# Patient Record
Sex: FEMALE | Race: WHITE | NOT HISPANIC OR LATINO | Employment: UNEMPLOYED | URBAN - NONMETROPOLITAN AREA
[De-identification: names, ages, dates, MRNs, and addresses within clinical notes are randomized per-mention and may not be internally consistent; named-entity substitution may affect disease eponyms.]

---

## 2017-01-05 RX ORDER — LEVOTHYROXINE SODIUM 0.03 MG/1
TABLET ORAL
Qty: 30 TABLET | Refills: 4 | Status: SHIPPED | OUTPATIENT
Start: 2017-01-05 | End: 2021-03-30 | Stop reason: SDUPTHER

## 2017-01-06 PROBLEM — Z91.89 HIGH RISK FOR DIABETES MELLITUS: Status: ACTIVE | Noted: 2017-01-06

## 2019-10-30 ENCOUNTER — OFFICE VISIT (OUTPATIENT)
Dept: SURGERY | Facility: CLINIC | Age: 59
End: 2019-10-30

## 2019-10-30 VITALS
BODY MASS INDEX: 33.16 KG/M2 | HEIGHT: 62 IN | TEMPERATURE: 98 F | OXYGEN SATURATION: 98 % | SYSTOLIC BLOOD PRESSURE: 130 MMHG | WEIGHT: 180.2 LBS | HEART RATE: 78 BPM | DIASTOLIC BLOOD PRESSURE: 87 MMHG

## 2019-10-30 DIAGNOSIS — E04.1 THYROID NODULE: Primary | ICD-10-CM

## 2019-10-30 PROCEDURE — 99243 OFF/OP CNSLTJ NEW/EST LOW 30: CPT | Performed by: SURGERY

## 2019-10-30 PROCEDURE — 10005 FNA BX W/US GDN 1ST LES: CPT | Performed by: SURGERY

## 2019-10-30 NOTE — PROGRESS NOTES
Patient: Monique Tierney    YOB: 1960    Date: 10/30/2019    Primary Care Provider: Linh Garber MD    Chief Complaint   Patient presents with   • Thyroid Problem       SUBJECTIVE:    History of present illness:  Patient is in the office today for an evaluation and consultation bilateral thyroid nodules. Patient is on Levothyroxine. She denies dysphagia, neck pain or heat intolerance.  She has a history of previous benign thyroid biopsies.  Patient is not on blood thinners.     She did have a previous thyroid ultrasound performed many years ago that showed a very small 7 mm solid region in the right thyroid lobe.    The following portions of the patient's history were reviewed and updated as appropriate: allergies, current medications, past family history, past medical history, past social history, past surgical history and problem list.       Review of Systems   Constitutional: Negative for chills, fever and unexpected weight change.   HENT: Negative for hearing loss, trouble swallowing and voice change.    Eyes: Negative for visual disturbance.   Respiratory: Negative for apnea, cough, chest tightness, shortness of breath and wheezing.    Cardiovascular: Negative for chest pain, palpitations and leg swelling.   Gastrointestinal: Negative for abdominal distention, abdominal pain, anal bleeding, blood in stool, constipation, diarrhea, nausea, rectal pain and vomiting.   Endocrine: Negative for cold intolerance and heat intolerance.   Genitourinary: Negative for difficulty urinating, dysuria and flank pain.   Musculoskeletal: Negative for back pain and gait problem.   Skin: Negative for color change, rash and wound.   Neurological: Negative for dizziness, syncope, speech difficulty, weakness, light-headedness, numbness and headaches.   Hematological: Negative for adenopathy. Does not bruise/bleed easily.   Psychiatric/Behavioral: Negative for confusion. The patient is not  "nervous/anxious.        Allergies:  No Known Allergies    Medications:    Current Outpatient Medications:   •  doxycycline (MONODOX) 100 MG capsule, 1 po bid, Disp: 20 capsule, Rfl: 0  •  ibuprofen (ADVIL,MOTRIN) 200 MG tablet, Take 200 mg by mouth Every 6 (Six) Hours As Needed for Mild Pain ., Disp: , Rfl:   •  levothyroxine (SYNTHROID, LEVOTHROID) 25 MCG tablet, TAKE 1 TABLET BY MOUTH ONE TIME A DAY , Disp: 30 tablet, Rfl: 4    History:  Past Medical History:   Diagnosis Date   • Arthritis    • Endometriosis    • Hyperthyroidism    • Uterine leiomyoma        Past Surgical History:   Procedure Laterality Date   • HYSTERECTOMY Bilateral 1999    Total Hysterectomy   • OOPHORECTOMY         Family History   Problem Relation Age of Onset   • Arthritis Mother    • Diabetes Mother    • Hypertension Mother    • Thyroid disease Mother    • Hyperlipidemia Mother    • Heart attack Father    • Heart disease Father    • Breast cancer Neg Hx    • Ovarian cancer Neg Hx        Social History     Tobacco Use   • Smoking status: Former Smoker   • Smokeless tobacco: Never Used   Substance Use Topics   • Alcohol use: No   • Drug use: No        OBJECTIVE:    Vital Signs:   Vitals:    10/30/19 1248   BP: 130/87   Pulse: 78   Temp: 98 °F (36.7 °C)   SpO2: 98%   Weight: 81.7 kg (180 lb 3.2 oz)   Height: 157.5 cm (62\")       Physical Exam:   General Appearance:    Alert, cooperative, in no acute distress   Head:    Normocephalic, without obvious abnormality, atraumatic   Eyes:            Lids and lashes normal, conjunctivae and sclerae normal, no   icterus, no pallor, corneas clear, PERRLA   Ears:    Ears appear intact with no abnormalities noted   Throat:   No oral lesions, no thrush, oral mucosa moist   Neck:   No adenopathy, supple, trachea midline, no thyromegaly, no   carotid bruit, no JVD, no masses   Lungs:     Clear to auscultation,respirations regular, even and                  unlabored    Heart:    Regular rhythm and normal " rate, normal S1 and S2, no            murmur, no gallop, no rub, no click   Chest Wall:    No abnormalities observed   Abdomen:     Normal bowel sounds, no masses, no organomegaly, soft        non-tender, non-distended, no guarding, no rebound                tenderness   Extremities:   Moves all extremities well, no edema, no cyanosis, no             redness   Pulses:   Pulses palpable and equal bilaterally   Skin:   No bleeding, bruising or rash   Lymph nodes:   No palpable adenopathy   Neurologic:   Cranial nerves 2 - 12 grossly intact, sensation intact, DTR       present and equal bilaterally     Results Review:   I reviewed the patient's new clinical results.  I reviewed the patient's new imaging results and agree with the interpretation.  I reviewed the patient's other test results and agree with the interpretation    ASSESSMENT/PLAN:    1. Thyroid nodule        I did have a detailed and extensive discussion with the patient in the office today.  She has a very small 7 mm solid area in the right thyroid lobe that has not changed over many years, fine-needle aspiration was performed today in the office.  I have asked her to keep her next scheduled appointment with Dr. Garcia.    Procedure:    I recommend a FNA of the right thyroid lesion. The procedure and risks were clearly explained including bleeding, infection and requirement for re-biopsy and the patient understood these and wishes to proceed.    The patient was brought to the procedure room. Consent and time out were performed. The area was prepped and draped in the usual fashion. 1% lidocaine with epinephrine was infused locally. A 20G needle was used to biopsy the lesion with ultrasound guidance.  Minimal blood loss had occurred and hemostasis had been well controlled with pressure.  The patient tolerated the procedure and there were no complications. We will make a f/u appointment to discuss results.      I discussed the patients findings and my  recommendations with patient    Review of Systems was reviewed and confirmed as accurate today.    Electronically signed by Erick Denny MD  10/31/19      .

## 2019-12-11 ENCOUNTER — OFFICE VISIT (OUTPATIENT)
Dept: SURGERY | Facility: CLINIC | Age: 59
End: 2019-12-11

## 2019-12-11 VITALS
OXYGEN SATURATION: 99 % | TEMPERATURE: 98.2 F | HEART RATE: 90 BPM | DIASTOLIC BLOOD PRESSURE: 70 MMHG | BODY MASS INDEX: 32.61 KG/M2 | SYSTOLIC BLOOD PRESSURE: 104 MMHG | HEIGHT: 62 IN | WEIGHT: 177.2 LBS

## 2019-12-11 DIAGNOSIS — K82.8 BILIARY DYSKINESIA: Primary | ICD-10-CM

## 2019-12-11 DIAGNOSIS — L72.3 SEBACEOUS CYST: ICD-10-CM

## 2019-12-11 PROCEDURE — 99204 OFFICE O/P NEW MOD 45 MIN: CPT | Performed by: SURGERY

## 2019-12-11 RX ORDER — SODIUM CHLORIDE 9 MG/ML
100 INJECTION, SOLUTION INTRAVENOUS CONTINUOUS
Status: CANCELLED | OUTPATIENT
Start: 2019-12-11

## 2019-12-11 NOTE — PROGRESS NOTES
Patient: oMnique Tierney    YOB: 1960    Date: 12/11/2019    Primary Care Provider: Linh Garber MD    Chief Complaint   Patient presents with   • Abdominal Pain       SUBJECTIVE:    History of present illness:  I saw the patient in the office today as a consultation for evaluation and treatment of intermittent stabbing right upper quadrant abdominal pain that does not radiate to her back or shoulders.  She has had the pain for 4-5 months.  She complains of nausea and diarrhea when she has attacks.  She denies vomiting or constipation.  Greasy or sweet foods cause the symptoms to worsen.  She had a hida scan that showed 0% ejection fraction.  Pain is about 5 out of 10, last 1-1/2 to 2 hours after eating.  Does not radiate except to the epigastric region and chest area.  No significant heartburn or reflux symptoms.    The following portions of the patient's history were reviewed and updated as appropriate: allergies, current medications, past family history, past medical history, past social history, past surgical history and problem list.    Review of Systems   Constitutional: Negative for chills, fever and unexpected weight change.   HENT: Negative for hearing loss, trouble swallowing and voice change.    Eyes: Negative for visual disturbance.   Respiratory: Negative for apnea, cough, chest tightness, shortness of breath and wheezing.    Cardiovascular: Negative for chest pain, palpitations and leg swelling.   Gastrointestinal: Positive for abdominal pain, diarrhea and nausea. Negative for abdominal distention, anal bleeding, blood in stool, constipation, rectal pain and vomiting.   Endocrine: Negative for cold intolerance and heat intolerance.   Genitourinary: Negative for difficulty urinating, dysuria and flank pain.   Musculoskeletal: Negative for back pain and gait problem.   Skin: Negative for color change, rash and wound.   Neurological: Negative for dizziness, syncope, speech  "difficulty, weakness, light-headedness, numbness and headaches.   Hematological: Negative for adenopathy. Does not bruise/bleed easily.   Psychiatric/Behavioral: Negative for confusion. The patient is not nervous/anxious.        History:  Past Medical History:   Diagnosis Date   • Arthritis    • Endometriosis    • Hyperthyroidism    • Uterine leiomyoma        Past Surgical History:   Procedure Laterality Date   • HYSTERECTOMY Bilateral 1999    Total Hysterectomy   • OOPHORECTOMY         Family History   Problem Relation Age of Onset   • Arthritis Mother    • Diabetes Mother    • Hypertension Mother    • Thyroid disease Mother    • Hyperlipidemia Mother    • Heart attack Father    • Heart disease Father    • Breast cancer Neg Hx    • Ovarian cancer Neg Hx        Social History     Tobacco Use   • Smoking status: Former Smoker   • Smokeless tobacco: Never Used   Substance Use Topics   • Alcohol use: No   • Drug use: No       Allergies:  No Known Allergies    Medications:    Current Outpatient Medications:   •  ibuprofen (ADVIL,MOTRIN) 200 MG tablet, Take 200 mg by mouth Every 6 (Six) Hours As Needed for Mild Pain ., Disp: , Rfl:   •  levothyroxine (SYNTHROID, LEVOTHROID) 25 MCG tablet, TAKE 1 TABLET BY MOUTH ONE TIME A DAY  (Patient taking differently: 35 mcg.), Disp: 30 tablet, Rfl: 4    OBJECTIVE:    Vital Signs:   Vitals:    12/11/19 1038   BP: 104/70   Pulse: 90   Temp: 98.2 °F (36.8 °C)   TempSrc: Temporal   SpO2: 99%   Weight: 80.4 kg (177 lb 3.2 oz)   Height: 157.5 cm (62\")       Physical Exam:   General Appearance:    Alert, cooperative, in no acute distress   Head:    Normocephalic, without obvious abnormality, atraumatic   Eyes:            Lids and lashes normal, conjunctivae and sclerae normal, no   icterus, no pallor, corneas clear, PERRLA   Ears:    Ears appear intact with no abnormalities noted   Throat:   No oral lesions, no thrush, oral mucosa moist   Neck:   No adenopathy, supple, trachea midline, no " thyromegaly, no   carotid bruit, no JVD   Lungs:     Clear to auscultation,respirations regular, even and                  unlabored    Heart:    Regular rhythm and normal rate, normal S1 and S2, no            murmur   Abdomen:     no masses, no organomegaly, soft and tender right upper quadrant with mild guarding but no rebound.  No abdominal wall hernias.   Extremities:   Moves all extremities well, no edema, no cyanosis, no             redness   Pulses:   Pulses palpable and equal bilaterally   Skin:   No bleeding, bruising or rash.  3 to 4 cm sebaceous cyst on the left upper back.  Mobile and fixed to the skin.   Lymph nodes:   No palpable adenopathy   Neurologic:   Cranial nerves 2 - 12 grossly intact, sensation intact      Results Review:   I reviewed the patient's new clinical results.    Review of Systems was reviewed and confirmed as accurate.    ASSESSMENT/PLAN:    1. Biliary dyskinesia    2. Sebaceous cyst        I had a detailed and extensive discussion with the patient in the office and they understand that they need to undergo laparoscopic cholecystectomy with intraoperative cholangiography or possible open cholecystectomy. Full risks and benefits of operative versus nonoperative intervention were discussed with the patient and these included things such as nonresolution of symptoms and possible worsening of symptoms without surgical intervention versus infection, bleeding, open cholecystectomy, common bile duct injury, postoperative biliary leakage, need for drain placement, possible inability to perform cholangiography due to inflammation, postoperative abscess, etc with surgical intervention. The patient understands, agrees, and wishes to proceed with the surgical treatment plan as mentioned above. The patient had no questions for me at the end of the discussion.  I did draw a picture of the anatomy for the patient and used this in my informed consent.  Patient placed on a low-fat diet in the  interim.  Patient also will undergo excision sebaceous cyst on the upper back.     I discussed the patients findings and my recommendations with patient.    Electronically signed by Ezra Iniguez MD  12/11/19    Portions of this note have been scribed for Ezra Iniguez MD by Michelle Milligan. 12/11/2019  11:02 AM

## 2019-12-12 PROBLEM — K82.8 BILIARY DYSKINESIA: Status: ACTIVE | Noted: 2019-12-12

## 2019-12-12 PROBLEM — L72.3 SEBACEOUS CYST: Status: ACTIVE | Noted: 2019-12-12

## 2019-12-17 ENCOUNTER — TELEPHONE (OUTPATIENT)
Dept: SURGERY | Facility: CLINIC | Age: 59
End: 2019-12-17

## 2019-12-17 NOTE — TELEPHONE ENCOUNTER
Patient wants to cancel her surgery that is scheduled 12/27/19 .Patient stated she will call back at a later date and reschedule.

## 2020-02-06 NOTE — PROGRESS NOTES
Patient: Monique Tierney    YOB: 1960    Date: 02/12/2020    Primary Care Provider: Linh Garber MD    Reason for Consultation: Lesion    Chief Complaint   Patient presents with   • Mass     on the back       Subjective .     History of present illness:  I saw the patient in the office  today as a consultation for evaluation and treatment of cyst @ back right upper.    The following portions of the patient's history were reviewed and updated as appropriate: allergies, current medications, past family history, past medical history, past social history, past surgical history and problem list.    Review of Systems   Constitutional: Negative for chills, fever and unexpected weight change.   HENT: Negative for hearing loss, trouble swallowing and voice change.    Eyes: Negative for visual disturbance.   Respiratory: Negative for apnea, cough, chest tightness, shortness of breath and wheezing.    Cardiovascular: Negative for chest pain, palpitations and leg swelling.   Gastrointestinal: Negative for abdominal distention, abdominal pain, anal bleeding, blood in stool, constipation, diarrhea, nausea, rectal pain and vomiting.   Endocrine: Negative for cold intolerance and heat intolerance.   Genitourinary: Negative for difficulty urinating, dysuria and flank pain.   Musculoskeletal: Negative for back pain and gait problem.   Skin: Negative for color change, rash and wound.   Neurological: Negative for dizziness, syncope, speech difficulty, weakness, light-headedness, numbness and headaches.   Hematological: Negative for adenopathy. Does not bruise/bleed easily.   Psychiatric/Behavioral: Negative for confusion. The patient is not nervous/anxious.        History:  Past Medical History:   Diagnosis Date   • Arthritis    • Endometriosis    • Hyperthyroidism    • Uterine leiomyoma        Past Surgical History:   Procedure Laterality Date   • HYSTERECTOMY Bilateral 1999    Total Hysterectomy   •  "OOPHORECTOMY         Family History   Problem Relation Age of Onset   • Arthritis Mother    • Diabetes Mother    • Hypertension Mother    • Thyroid disease Mother    • Hyperlipidemia Mother    • Heart attack Father    • Heart disease Father    • Breast cancer Neg Hx    • Ovarian cancer Neg Hx        Social History     Tobacco Use   • Smoking status: Former Smoker   • Smokeless tobacco: Never Used   Substance Use Topics   • Alcohol use: No   • Drug use: No        Allergies:  No Known Allergies    Medications:    Current Outpatient Medications:   •  ibuprofen (ADVIL,MOTRIN) 200 MG tablet, Take 200 mg by mouth Every 6 (Six) Hours As Needed for Mild Pain ., Disp: , Rfl:   •  levothyroxine (SYNTHROID, LEVOTHROID) 25 MCG tablet, TAKE 1 TABLET BY MOUTH ONE TIME A DAY  (Patient taking differently: 35 mcg.), Disp: 30 tablet, Rfl: 4    Objective     Vital Signs:   Vitals:    02/12/20 1107   BP: 132/82   Pulse: 83   Temp: 97.7 °F (36.5 °C)   TempSrc: Temporal   SpO2: 95%   Weight: 78.7 kg (173 lb 9.6 oz)   Height: 157.5 cm (62\")       Physical Exam:  Lungs:     Clear to auscultation,respirations regular, even and                  unlabored    Heart:    Regular rhythm and normal rate, normal S1 and S2, no            murmur      General Appearance:    Alert, cooperative, in no acute distress   Head:    Normocephalic, without obvious abnormality, atraumatic   Eyes:            Lids and lashes normal, conjunctivae and sclerae normal, no   icterus, no pallor, corneas clear.   Ears:    Ears appear intact with no abnormalities noted   Throat:   No oral lesions, no thrush, oral mucosa moist   Neck:   No adenopathy, supple, trachea midline, no thyromegaly, no   carotid bruit.   Extremities:   Moves all extremities well, no edema, no cyanosis, no             Redness.    Pulses:   Pulses palpable and equal bilaterally   Skin:   No bleeding, bruising or rash. Lesion mid back   Lymph nodes:   No palpable adenopathy   Neurologic:   Cranial " nerves 2 - 12 grossly intact, sensation intact.     Results Review:   I reviewed the patient's new clinical results.    Review of Systems was reviewed and confirmed as accurate as documented by the MA.    Assessment/Plan     1. Sebaceous cyst        I did have a detailed and extensive discussion with the patient in the hospital and they understand that they need to undergo excision of sebaceous cyst on the back. Full risks and benefits of operative versus nonoperative intervention were discussed with the patient and these include bleeding, infection and reccurrence. The patient understands, agrees, and wishes to proceed with the surgical treatment plan as mentioned above. The patient had no questions for me at the end of the discussion.       I discussed the patients findings and my recommendations with patient and consulting provider.    Location: Back    Procedure: Excision 4 cm sebaceous cyst back with layered closure      I recommend excision. Procedure and the risks and benefits were explained including bleeding and infection. The patient understands these and wishes to proceed.     The patient was brought to the procedure room. Consent and time out were performed. The area was prepped and draped in the usual fashion. 1% lidocaine with epinephrine was infused locally. An ellyptical incision was made around the lesion. Full thickness excision was performed. The lesion size was 4 cm. The wound was closed in layers with interrupted simple vicryl and Nylon for the skin. Wound closure size was 5 cm. There were no complications and the patient tolerated the procedure well. Hemostasis was well controlled with pressure and there was minimal blood loss. Wound instructions were given.     Electronically signed by Ezra Iniguez MD  02/12/20  11:55 AM    Portions of this note have been scribed for Ezra Iniguez MD by Jade Kingston. 2/12/2020  11:55 AM

## 2020-02-12 ENCOUNTER — PROCEDURE VISIT (OUTPATIENT)
Dept: SURGERY | Facility: CLINIC | Age: 60
End: 2020-02-12

## 2020-02-12 VITALS
WEIGHT: 173.6 LBS | HEART RATE: 83 BPM | TEMPERATURE: 97.7 F | OXYGEN SATURATION: 95 % | HEIGHT: 62 IN | DIASTOLIC BLOOD PRESSURE: 82 MMHG | SYSTOLIC BLOOD PRESSURE: 132 MMHG | BODY MASS INDEX: 31.94 KG/M2

## 2020-02-12 DIAGNOSIS — L72.3 SEBACEOUS CYST: Primary | ICD-10-CM

## 2020-02-12 PROCEDURE — 12032 INTMD RPR S/A/T/EXT 2.6-7.5: CPT | Performed by: SURGERY

## 2020-02-12 PROCEDURE — 11406 EXC TR-EXT B9+MARG >4.0 CM: CPT | Performed by: SURGERY

## 2020-02-14 NOTE — PROGRESS NOTES
"Patient: Monique Tierney    YOB: 1960    Date: 02/19/2020    Primary Care Provider: Linh Garber MD    Chief Complaint   Patient presents with   • Post-op Follow-up     excision @ back       History of present illness:  I saw the patient in the office today as a followup from their recent sebaceous cyst excision @ back.  They state that they have done well and are having no problems.    Vital Signs:  Vitals:    02/19/20 1100   BP: 110/62   Pulse: 79   Temp: 97.5 °F (36.4 °C)   TempSrc: Temporal   SpO2: 96%   Weight: 78.5 kg (173 lb)   Height: 157.5 cm (62\")       Physical Exam:   General Appearance:    Alert, cooperative, in no acute distress, wound clean dry without infection   Abdomen:     no masses, no organomegaly, soft non-tender, non-distended, no guarding, wounds are well healed   Chest:      Clear to ausculation       Assessment / Plan:    1. Postoperative visit        I did discuss the situation with the patient today in the office and they have done well from their recent lesion excision, I don't think that the patient needs any further intervention and I need to see them back only if they have further problems. Pathology report was reviewed with the patient in the office.    Electronically signed by Ezra Iniguez MD  02/19/20                Portions of this note have been scribed for Ezra Iniguez MD by Jade Kingston. 2/19/2020  11:10 AM        "

## 2020-02-19 ENCOUNTER — OFFICE VISIT (OUTPATIENT)
Dept: SURGERY | Facility: CLINIC | Age: 60
End: 2020-02-19

## 2020-02-19 VITALS
TEMPERATURE: 97.5 F | WEIGHT: 173 LBS | HEIGHT: 62 IN | DIASTOLIC BLOOD PRESSURE: 62 MMHG | OXYGEN SATURATION: 96 % | HEART RATE: 79 BPM | SYSTOLIC BLOOD PRESSURE: 110 MMHG | BODY MASS INDEX: 31.83 KG/M2

## 2020-02-19 DIAGNOSIS — Z48.89 POSTOPERATIVE VISIT: Primary | ICD-10-CM

## 2020-02-19 PROCEDURE — 99024 POSTOP FOLLOW-UP VISIT: CPT | Performed by: SURGERY

## 2021-02-17 ENCOUNTER — OFFICE VISIT (OUTPATIENT)
Dept: INTERNAL MEDICINE | Facility: CLINIC | Age: 61
End: 2021-02-17

## 2021-02-17 VITALS
HEIGHT: 62 IN | HEART RATE: 100 BPM | WEIGHT: 193 LBS | TEMPERATURE: 96.8 F | OXYGEN SATURATION: 96 % | BODY MASS INDEX: 35.51 KG/M2 | SYSTOLIC BLOOD PRESSURE: 120 MMHG | DIASTOLIC BLOOD PRESSURE: 82 MMHG

## 2021-02-17 DIAGNOSIS — R73.03 PREDIABETES: ICD-10-CM

## 2021-02-17 DIAGNOSIS — M17.0 OSTEOARTHRITIS OF BOTH KNEES, UNSPECIFIED OSTEOARTHRITIS TYPE: ICD-10-CM

## 2021-02-17 DIAGNOSIS — R53.83 OTHER FATIGUE: ICD-10-CM

## 2021-02-17 DIAGNOSIS — Z90.79 STATUS POST TAH-BSO: Primary | ICD-10-CM

## 2021-02-17 DIAGNOSIS — Z90.710 STATUS POST TAH-BSO: Primary | ICD-10-CM

## 2021-02-17 DIAGNOSIS — Z90.722 STATUS POST TAH-BSO: Primary | ICD-10-CM

## 2021-02-17 DIAGNOSIS — I45.9 SKIPPED BEATS: ICD-10-CM

## 2021-02-17 DIAGNOSIS — E66.9 CLASS 1 OBESITY WITHOUT SERIOUS COMORBIDITY WITH BODY MASS INDEX (BMI) OF 34.0 TO 34.9 IN ADULT, UNSPECIFIED OBESITY TYPE: ICD-10-CM

## 2021-02-17 DIAGNOSIS — E03.9 HYPOTHYROIDISM, UNSPECIFIED TYPE: ICD-10-CM

## 2021-02-17 DIAGNOSIS — M79.645 CHRONIC PAIN OF LEFT THUMB: ICD-10-CM

## 2021-02-17 DIAGNOSIS — G89.29 CHRONIC PAIN OF LEFT THUMB: ICD-10-CM

## 2021-02-17 DIAGNOSIS — Z87.891 FORMER TOBACCO USE: ICD-10-CM

## 2021-02-17 PROCEDURE — 99214 OFFICE O/P EST MOD 30 MIN: CPT | Performed by: INTERNAL MEDICINE

## 2021-02-17 PROCEDURE — 93005 ELECTROCARDIOGRAM TRACING: CPT | Performed by: INTERNAL MEDICINE

## 2021-02-17 NOTE — PROGRESS NOTES
"Subjective   Moniquemigel Tierney is a 60 y.o. female.     Chief Complaint   Patient presents with   • Establish Care     family history of diabetes- request A1C   • Knee Pain     bilateral; right knee is worse   • Skin Problem     right upper thigh; muscular\"knots\"; sore       History of Present Illness   Patient presents to establish care. Osteoarthritis present in knees bilateral, pain is worse in the right knee. Follows with orthopedics, receives knee injections, bone to bone. Knots in leg painful to the touch, ibuprofen helps some. Glucose elevated on last lab but states she had eaten that day. Ovarian cyst total hysterectomy. Heart occasionally skips a beat, 6 months. Once every 2 weeks. Feels like a flutter    Current Outpatient Medications:   •  ibuprofen (ADVIL,MOTRIN) 200 MG tablet, Take 200 mg by mouth Every 6 (Six) Hours As Needed for Mild Pain ., Disp: , Rfl:   •  levothyroxine (SYNTHROID, LEVOTHROID) 25 MCG tablet, TAKE 1 TABLET BY MOUTH ONE TIME A DAY  (Patient taking differently: 35 mcg.), Disp: 30 tablet, Rfl: 4    The following portions of the patient's history were reviewed and updated as appropriate: allergies, current medications, past family history, past medical history, past social history, past surgical history and problem list.    Review of Systems   Constitutional: Negative.    Respiratory: Negative.    Cardiovascular: Negative.    Gastrointestinal: Negative.    Musculoskeletal: Positive for arthralgias.   Neurological: Negative for dizziness and headaches.       Objective   Physical Exam  Constitutional:       Appearance: Normal appearance.   HENT:      Head: Normocephalic and atraumatic.   Neck:      Musculoskeletal: Neck supple.   Cardiovascular:      Rate and Rhythm: Normal rate and regular rhythm.   Pulmonary:      Effort: Pulmonary effort is normal.      Breath sounds: Normal breath sounds.   Abdominal:      Palpations: Abdomen is soft.   Skin:     General: Skin is warm.   Neurological: "      Mental Status: She is alert and oriented to person, place, and time.   Psychiatric:         Mood and Affect: Mood normal.         All tests have been reviewed.    Assessment/Plan   Diagnoses and all orders for this visit:    Status post ARTHUR-BSO    Other fatigue  -     CBC & Differential  -     Comprehensive Metabolic Panel    Prediabetes  -     Lipid Panel  -     Hemoglobin A1c    Hypothyroidism, unspecified type  -     TSH  -     T4, Free    Osteoarthritis of both knees, unspecified osteoarthritis type  -     C-reactive Protein  -     Sedimentation Rate    Class 1 obesity without serious comorbidity with body mass index (BMI) of 34.0 to 34.9 in adult, unspecified obesity type    Former tobacco use    Chronic pain of left thumb    Skipped beats    Other orders  -     ECG 12 Lead    1 mo after labgs    ECG 12 Lead    Date/Time: 2/17/2021 1:59 PM  Performed by: Sebastian Ceballos MD  Authorized by: Sebastian Ceballos MD   Comparison: not compared with previous ECG   Rhythm: sinus rhythm  Rate: normal  Conduction: conduction normal  ST Segments: ST segments normal  QRS axis: normal    Clinical impression: normal ECG

## 2021-03-17 ENCOUNTER — OFFICE VISIT (OUTPATIENT)
Dept: INTERNAL MEDICINE | Facility: CLINIC | Age: 61
End: 2021-03-17

## 2021-03-17 VITALS
SYSTOLIC BLOOD PRESSURE: 130 MMHG | BODY MASS INDEX: 35.7 KG/M2 | HEART RATE: 100 BPM | WEIGHT: 194 LBS | TEMPERATURE: 95 F | OXYGEN SATURATION: 98 % | HEIGHT: 62 IN | DIASTOLIC BLOOD PRESSURE: 80 MMHG

## 2021-03-17 DIAGNOSIS — Z91.89 HIGH RISK FOR DIABETES MELLITUS: Primary | ICD-10-CM

## 2021-03-17 DIAGNOSIS — E66.9 CLASS 1 OBESITY WITHOUT SERIOUS COMORBIDITY WITH BODY MASS INDEX (BMI) OF 34.0 TO 34.9 IN ADULT, UNSPECIFIED OBESITY TYPE: ICD-10-CM

## 2021-03-17 DIAGNOSIS — M17.0 OSTEOARTHRITIS OF BOTH KNEES, UNSPECIFIED OSTEOARTHRITIS TYPE: ICD-10-CM

## 2021-03-17 DIAGNOSIS — I45.9 SKIPPED BEATS: ICD-10-CM

## 2021-03-17 DIAGNOSIS — R73.03 PREDIABETES: ICD-10-CM

## 2021-03-17 DIAGNOSIS — Z87.891 FORMER TOBACCO USE: ICD-10-CM

## 2021-03-17 DIAGNOSIS — K82.8 BILIARY DYSKINESIA: ICD-10-CM

## 2021-03-17 DIAGNOSIS — Z12.11 COLON CANCER SCREENING: ICD-10-CM

## 2021-03-17 DIAGNOSIS — E04.9 GOITER: ICD-10-CM

## 2021-03-17 DIAGNOSIS — M79.645 CHRONIC PAIN OF LEFT THUMB: ICD-10-CM

## 2021-03-17 DIAGNOSIS — L72.3 SEBACEOUS CYST: ICD-10-CM

## 2021-03-17 DIAGNOSIS — E03.9 HYPOTHYROIDISM, UNSPECIFIED TYPE: ICD-10-CM

## 2021-03-17 DIAGNOSIS — Z90.79 STATUS POST TAH-BSO: ICD-10-CM

## 2021-03-17 DIAGNOSIS — Z90.722 STATUS POST TAH-BSO: ICD-10-CM

## 2021-03-17 DIAGNOSIS — G89.29 CHRONIC PAIN OF LEFT THUMB: ICD-10-CM

## 2021-03-17 DIAGNOSIS — Z90.710 STATUS POST TAH-BSO: ICD-10-CM

## 2021-03-17 DIAGNOSIS — R53.83 OTHER FATIGUE: ICD-10-CM

## 2021-03-17 PROCEDURE — 99396 PREV VISIT EST AGE 40-64: CPT | Performed by: INTERNAL MEDICINE

## 2021-03-17 RX ORDER — CELECOXIB 200 MG/1
200 CAPSULE ORAL DAILY
Qty: 30 CAPSULE | Refills: 1 | Status: SHIPPED | OUTPATIENT
Start: 2021-03-17 | End: 2021-04-29

## 2021-03-17 NOTE — PROGRESS NOTES
Subjective   Monique Tierney is a 60 y.o. female and is here for a comprehensive physical exam.     Patient here for physical also has medical problems to be discussed.  Fatigue no energy.  No sleeping problem.  Patient does have prediabetes and hypothyroidism.  Patient is taking thyroid medication.  Also complains of both knee joints pain right side worse bearing weight worse medication over-the-counter no help.  Weight is still elevated BMI 35.  Also complains numbness tingling in right hand waking up in the morning.  Occasional skipped beats.  Heart rate today 100.  Patient complains anxious nervous denies any depression suicidal thoughts.    Do you take any herbs or supplements that were not prescribed by a doctor? no  Are you taking calcium supplements? no  Are you taking aspirin daily? no      The following portions of the patient's history were reviewed and updated as appropriate: allergies, current medications, past family history, past medical history, past social history, past surgical history and problem list.      Review of Systems   Constitutional: Positive for fatigue.   HENT: Negative.    Eyes: Negative.    Respiratory: Negative.    Cardiovascular: Negative.    Gastrointestinal: Negative.    Endocrine: Negative.    Genitourinary: Negative.    Musculoskeletal: Positive for arthralgias.   Skin: Negative.    Allergic/Immunologic: Negative.    Neurological: Negative.    Hematological: Negative.    Psychiatric/Behavioral: The patient is nervous/anxious.          Physical Exam  Constitutional:       Appearance: Normal appearance. She is obese.   HENT:      Head: Normocephalic and atraumatic.      Right Ear: Tympanic membrane, ear canal and external ear normal.      Left Ear: Tympanic membrane, ear canal and external ear normal.      Mouth/Throat:      Mouth: Mucous membranes are moist.      Pharynx: Oropharynx is clear.   Eyes:      Extraocular Movements: Extraocular movements intact.       Conjunctiva/sclera: Conjunctivae normal.      Pupils: Pupils are equal, round, and reactive to light.   Cardiovascular:      Rate and Rhythm: Normal rate and regular rhythm.      Heart sounds: Normal heart sounds.   Pulmonary:      Effort: Pulmonary effort is normal.      Breath sounds: Normal breath sounds.   Abdominal:      General: Bowel sounds are normal.   Musculoskeletal:         General: Tenderness present. Normal range of motion.      Cervical back: Neck supple.   Skin:     General: Skin is warm.   Neurological:      Mental Status: She is alert and oriented to person, place, and time. Mental status is at baseline.   Psychiatric:         Mood and Affect: Mood normal.         Behavior: Behavior normal.         Thought Content: Thought content normal.         Judgment: Judgment normal.         All  tests have been reviewed.    Assessment/Plan          1. Patient Counseling:  --Nutrition: Stressed importance of moderation in sodium/caffeine intake, saturated fat and cholesterol, caloric balance, sufficient intake of fresh fruits, vegetables, fiber, calcium and iron.  --Exercise: Stressed the importance of regular exercise.   --Injury prevention: Discussed safety belts, safety helmets, smoke detector, smoking near bedding or upholstery.   --Dental health: Discussed importance of regular tooth brushing, flossing, and dental visits.  --Immunizations reviewed.  --Discussed benefits of screening colonoscopy.  --After hours service discussed with patient    2. Discussed the patient's BMI with her.            Status post ARTHUR-BSO DUE to fibroids     Other fatigue check lab  -     CBC & Differential  -     Comprehensive Metabolic Panel     Prediabetes check lab  -     Lipid Panel  -     Hemoglobin A1c     Hypothyroidism, continue medication  -     TSH  -     T4, Free     Osteoarthritis of both knees, unspecified osteoarthritis type start celebrex and follow-up with orthopedist possible knee joint replacement.  Also check  lab rule out rheumatoid arthritis  -     C-reactive Protein  -     Sedimentation Rate     Class 1 obesity without serious comorbidity with body mass index (BMI) of 34.0 to 34.9 in adult, unspecified obesity type need a good diet weight loss     Former tobacco use     Hand numbness asked patient to use wrist splint     Skipped beats occa     Repeat HR 80    Anxiety self controlled patient declined medication for now    Refuse colonoscopy agree cologuard     Vaccines after covid vaccine

## 2021-03-20 LAB
ALBUMIN SERPL-MCNC: 4.4 G/DL (ref 3.5–5.2)
ALBUMIN/GLOB SERPL: 1.9 G/DL
ALP SERPL-CCNC: 96 U/L (ref 39–117)
ALT SERPL-CCNC: 19 U/L (ref 1–33)
AST SERPL-CCNC: 19 U/L (ref 1–32)
BASOPHILS # BLD AUTO: 0.08 10*3/MM3 (ref 0–0.2)
BASOPHILS NFR BLD AUTO: 1.3 % (ref 0–1.5)
BILIRUB SERPL-MCNC: 0.4 MG/DL (ref 0–1.2)
BUN SERPL-MCNC: 16 MG/DL (ref 6–20)
BUN/CREAT SERPL: 26.7 (ref 7–25)
CALCIUM SERPL-MCNC: 9.5 MG/DL (ref 8.6–10.5)
CHLORIDE SERPL-SCNC: 104 MMOL/L (ref 98–107)
CHOLEST SERPL-MCNC: 194 MG/DL (ref 0–200)
CO2 SERPL-SCNC: 25.1 MMOL/L (ref 22–29)
CREAT SERPL-MCNC: 0.6 MG/DL (ref 0.57–1)
CRP SERPL-MCNC: <0.3 MG/DL (ref 0–0.5)
EOSINOPHIL # BLD AUTO: 0.11 10*3/MM3 (ref 0–0.4)
EOSINOPHIL NFR BLD AUTO: 1.8 % (ref 0.3–6.2)
ERYTHROCYTE [DISTWIDTH] IN BLOOD BY AUTOMATED COUNT: 12.2 % (ref 12.3–15.4)
ERYTHROCYTE [SEDIMENTATION RATE] IN BLOOD BY WESTERGREN METHOD: 16 MM/HR (ref 0–30)
GLOBULIN SER CALC-MCNC: 2.3 GM/DL
GLUCOSE SERPL-MCNC: 104 MG/DL (ref 65–99)
HBA1C MFR BLD: 5.4 % (ref 4.8–5.6)
HCT VFR BLD AUTO: 42.3 % (ref 34–46.6)
HDLC SERPL-MCNC: 45 MG/DL (ref 40–60)
HGB BLD-MCNC: 14.5 G/DL (ref 12–15.9)
IMM GRANULOCYTES # BLD AUTO: 0.02 10*3/MM3 (ref 0–0.05)
IMM GRANULOCYTES NFR BLD AUTO: 0.3 % (ref 0–0.5)
LDLC SERPL CALC-MCNC: 131 MG/DL (ref 0–100)
LYMPHOCYTES # BLD AUTO: 2.2 10*3/MM3 (ref 0.7–3.1)
LYMPHOCYTES NFR BLD AUTO: 35.1 % (ref 19.6–45.3)
MCH RBC QN AUTO: 29.9 PG (ref 26.6–33)
MCHC RBC AUTO-ENTMCNC: 34.3 G/DL (ref 31.5–35.7)
MCV RBC AUTO: 87.2 FL (ref 79–97)
MONOCYTES # BLD AUTO: 0.49 10*3/MM3 (ref 0.1–0.9)
MONOCYTES NFR BLD AUTO: 7.8 % (ref 5–12)
NEUTROPHILS # BLD AUTO: 3.37 10*3/MM3 (ref 1.7–7)
NEUTROPHILS NFR BLD AUTO: 53.7 % (ref 42.7–76)
NRBC BLD AUTO-RTO: 0 /100 WBC (ref 0–0.2)
PLATELET # BLD AUTO: 257 10*3/MM3 (ref 140–450)
POTASSIUM SERPL-SCNC: 4.5 MMOL/L (ref 3.5–5.2)
PROT SERPL-MCNC: 6.7 G/DL (ref 6–8.5)
RBC # BLD AUTO: 4.85 10*6/MM3 (ref 3.77–5.28)
SODIUM SERPL-SCNC: 140 MMOL/L (ref 136–145)
T4 FREE SERPL-MCNC: 1.13 NG/DL (ref 0.93–1.7)
TRIGL SERPL-MCNC: 98 MG/DL (ref 0–150)
TSH SERPL DL<=0.005 MIU/L-ACNC: 5.22 UIU/ML (ref 0.27–4.2)
VLDLC SERPL CALC-MCNC: 18 MG/DL (ref 5–40)
WBC # BLD AUTO: 6.27 10*3/MM3 (ref 3.4–10.8)

## 2021-03-30 ENCOUNTER — OFFICE VISIT (OUTPATIENT)
Dept: INTERNAL MEDICINE | Facility: CLINIC | Age: 61
End: 2021-03-30

## 2021-03-30 VITALS
DIASTOLIC BLOOD PRESSURE: 80 MMHG | TEMPERATURE: 96.6 F | SYSTOLIC BLOOD PRESSURE: 130 MMHG | HEART RATE: 91 BPM | HEIGHT: 62 IN | OXYGEN SATURATION: 98 % | BODY MASS INDEX: 35.51 KG/M2 | WEIGHT: 193 LBS

## 2021-03-30 DIAGNOSIS — R73.03 PREDIABETES: Primary | ICD-10-CM

## 2021-03-30 DIAGNOSIS — E03.9 HYPOTHYROIDISM, UNSPECIFIED TYPE: ICD-10-CM

## 2021-03-30 DIAGNOSIS — R53.83 OTHER FATIGUE: ICD-10-CM

## 2021-03-30 DIAGNOSIS — M17.0 OSTEOARTHRITIS OF BOTH KNEES, UNSPECIFIED OSTEOARTHRITIS TYPE: ICD-10-CM

## 2021-03-30 PROCEDURE — 99214 OFFICE O/P EST MOD 30 MIN: CPT | Performed by: INTERNAL MEDICINE

## 2021-03-30 RX ORDER — LEVOTHYROXINE SODIUM 0.05 MG/1
25 TABLET ORAL DAILY
Qty: 30 TABLET | Refills: 1 | Status: SHIPPED | OUTPATIENT
Start: 2021-03-30 | End: 2021-04-07 | Stop reason: SDUPTHER

## 2021-03-30 NOTE — PROGRESS NOTES
Subjective   Monique Tierney is a 60 y.o. female.     Chief Complaint   Patient presents with   • Follow-up     recent lab work        History of Present Illness   Patient here for follow-up.  Sugar slightly elevated TSH elevated LDL elevated occasional heart racing.  Knee joint pain pending surgery.  No rheumatoid arthritis.  Weight is still elevated.    Current Outpatient Medications:   •  levothyroxine (SYNTHROID, LEVOTHROID) 50 MCG tablet, Take 0.5 tablets by mouth Daily. 200001, Disp: 30 tablet, Rfl: 1  •  celecoxib (CeleBREX) 200 MG capsule, Take 1 capsule by mouth Daily., Disp: 30 capsule, Rfl: 1    The following portions of the patient's history were reviewed and updated as appropriate: allergies, current medications, past family history, past medical history, past social history, past surgical history and problem list.    Review of Systems   Constitutional: Negative.    HENT: Negative.    Eyes: Negative.    Respiratory: Negative.    Cardiovascular: Negative.    Gastrointestinal: Negative.    Endocrine: Negative.    Genitourinary: Negative.    Musculoskeletal: Negative.    Skin: Negative.    Allergic/Immunologic: Negative.    Neurological: Negative.    Hematological: Negative.    Psychiatric/Behavioral: Negative.        Objective   Physical Exam  Constitutional:       Appearance: She is well-developed.   Cardiovascular:      Rate and Rhythm: Normal rate and regular rhythm.      Heart sounds: Normal heart sounds.   Pulmonary:      Effort: Pulmonary effort is normal.      Breath sounds: Normal breath sounds.   Abdominal:      General: Bowel sounds are normal.      Palpations: Abdomen is soft.   Musculoskeletal:      Cervical back: Neck supple.   Neurological:      Mental Status: She is alert and oriented to person, place, and time.   Psychiatric:         Behavior: Behavior normal.         All tests have been reviewed.    Assessment/Plan   Diagnoses and all orders for this visit:                  Status post  ARTHUR-BSO DUE to fibroids     Other fatigue  increase levothyroxine to 50 mcg daily     Prediabetes  good diet weight loss     Hypothyroidism increase levothyroxine to 50 mcg daily     Osteoarthritis of both knees, pending surgery     Class 1 obesity without serious comorbidity with body mass index (BMI) of 34.0 to 34.9 in adult, unspecified obesity type need a good diet weight loss     Former tobacco use     Hand numbness cont  wrist splint     Skipped beats racing heart occa with anxiety attack , call if worse     Anxiety self controlled patient declined medication for now     Refuse colonoscopy agree cologuard      Vaccines after covid vaccine   8 weeks after labs

## 2021-04-06 ENCOUNTER — TELEPHONE (OUTPATIENT)
Dept: INTERNAL MEDICINE | Facility: CLINIC | Age: 61
End: 2021-04-06

## 2021-04-06 DIAGNOSIS — E03.9 HYPOTHYROIDISM, UNSPECIFIED TYPE: ICD-10-CM

## 2021-04-06 RX ORDER — LEVOTHYROXINE SODIUM 0.05 MG/1
50 TABLET ORAL DAILY
Qty: 30 TABLET | Refills: 1 | Status: CANCELLED | OUTPATIENT
Start: 2021-04-06

## 2021-04-07 DIAGNOSIS — E03.9 HYPOTHYROIDISM, UNSPECIFIED TYPE: ICD-10-CM

## 2021-04-07 RX ORDER — LEVOTHYROXINE SODIUM 0.05 MG/1
TABLET ORAL
Qty: 30 TABLET | Refills: 3 | Status: SHIPPED | OUTPATIENT
Start: 2021-04-07 | End: 2021-07-04 | Stop reason: SDUPTHER

## 2021-04-29 ENCOUNTER — PRE-ADMISSION TESTING (OUTPATIENT)
Dept: PREADMISSION TESTING | Facility: HOSPITAL | Age: 61
End: 2021-04-29

## 2021-04-29 VITALS — WEIGHT: 192.9 LBS | BODY MASS INDEX: 35.5 KG/M2 | HEIGHT: 62 IN

## 2021-04-29 LAB
ANION GAP SERPL CALCULATED.3IONS-SCNC: 10 MMOL/L (ref 5–15)
BUN SERPL-MCNC: 21 MG/DL (ref 6–20)
BUN/CREAT SERPL: 35 (ref 7–25)
CALCIUM SPEC-SCNC: 9.5 MG/DL (ref 8.6–10.5)
CHLORIDE SERPL-SCNC: 104 MMOL/L (ref 98–107)
CO2 SERPL-SCNC: 26 MMOL/L (ref 22–29)
CREAT SERPL-MCNC: 0.6 MG/DL (ref 0.57–1)
DEPRECATED RDW RBC AUTO: 39.8 FL (ref 37–54)
ERYTHROCYTE [DISTWIDTH] IN BLOOD BY AUTOMATED COUNT: 12.2 % (ref 12.3–15.4)
GFR SERPL CREATININE-BSD FRML MDRD: 102 ML/MIN/1.73
GLUCOSE SERPL-MCNC: 99 MG/DL (ref 65–99)
HBA1C MFR BLD: 5.4 % (ref 4.8–5.6)
HCT VFR BLD AUTO: 42.7 % (ref 34–46.6)
HGB BLD-MCNC: 14.4 G/DL (ref 12–15.9)
MCH RBC QN AUTO: 29.9 PG (ref 26.6–33)
MCHC RBC AUTO-ENTMCNC: 33.7 G/DL (ref 31.5–35.7)
MCV RBC AUTO: 88.8 FL (ref 79–97)
PLATELET # BLD AUTO: 249 10*3/MM3 (ref 140–450)
PMV BLD AUTO: 11.2 FL (ref 6–12)
POTASSIUM SERPL-SCNC: 4.4 MMOL/L (ref 3.5–5.2)
RBC # BLD AUTO: 4.81 10*6/MM3 (ref 3.77–5.28)
SODIUM SERPL-SCNC: 140 MMOL/L (ref 136–145)
WBC # BLD AUTO: 8.78 10*3/MM3 (ref 3.4–10.8)

## 2021-04-29 PROCEDURE — 85027 COMPLETE CBC AUTOMATED: CPT

## 2021-04-29 PROCEDURE — 80048 BASIC METABOLIC PNL TOTAL CA: CPT

## 2021-04-29 PROCEDURE — 36415 COLL VENOUS BLD VENIPUNCTURE: CPT

## 2021-04-29 PROCEDURE — 83036 HEMOGLOBIN GLYCOSYLATED A1C: CPT

## 2021-04-29 RX ORDER — IBUPROFEN 200 MG
400 TABLET ORAL EVERY 6 HOURS PRN
COMMUNITY
End: 2021-05-14 | Stop reason: HOSPADM

## 2021-04-29 ASSESSMENT — KOOS JR
KOOS JR SCORE: 22
KOOS JR SCORE: 31.307

## 2021-05-11 ENCOUNTER — APPOINTMENT (OUTPATIENT)
Dept: PREADMISSION TESTING | Facility: HOSPITAL | Age: 61
End: 2021-05-11

## 2021-05-11 LAB — SARS-COV-2 RNA PNL SPEC NAA+PROBE: NOT DETECTED

## 2021-05-11 PROCEDURE — C9803 HOPD COVID-19 SPEC COLLECT: HCPCS

## 2021-05-11 PROCEDURE — U0004 COV-19 TEST NON-CDC HGH THRU: HCPCS

## 2021-05-13 ENCOUNTER — ANESTHESIA EVENT CONVERTED (OUTPATIENT)
Dept: ANESTHESIOLOGY | Facility: HOSPITAL | Age: 61
End: 2021-05-13

## 2021-05-13 ENCOUNTER — HOSPITAL ENCOUNTER (OUTPATIENT)
Facility: HOSPITAL | Age: 61
Discharge: HOME | End: 2021-05-14
Attending: ORTHOPAEDIC SURGERY | Admitting: ORTHOPAEDIC SURGERY

## 2021-05-13 ENCOUNTER — ANESTHESIA EVENT (OUTPATIENT)
Dept: PERIOP | Facility: HOSPITAL | Age: 61
End: 2021-05-13

## 2021-05-13 ENCOUNTER — ANESTHESIA (OUTPATIENT)
Dept: PERIOP | Facility: HOSPITAL | Age: 61
End: 2021-05-13

## 2021-05-13 ENCOUNTER — APPOINTMENT (OUTPATIENT)
Dept: GENERAL RADIOLOGY | Facility: HOSPITAL | Age: 61
End: 2021-05-13

## 2021-05-13 DIAGNOSIS — Z90.722 STATUS POST TAH-BSO: ICD-10-CM

## 2021-05-13 DIAGNOSIS — M17.0 OSTEOARTHRITIS OF BOTH KNEES, UNSPECIFIED OSTEOARTHRITIS TYPE: ICD-10-CM

## 2021-05-13 DIAGNOSIS — M17.11 PRIMARY LOCALIZED OSTEOARTHRITIS OF RIGHT KNEE: ICD-10-CM

## 2021-05-13 DIAGNOSIS — Z90.710 STATUS POST TAH-BSO: ICD-10-CM

## 2021-05-13 DIAGNOSIS — Z90.79 STATUS POST TAH-BSO: ICD-10-CM

## 2021-05-13 DIAGNOSIS — Z96.651 S/P TOTAL KNEE ARTHROPLASTY, RIGHT: Primary | ICD-10-CM

## 2021-05-13 PROBLEM — F41.9 ANXIETY: Status: ACTIVE | Noted: 2021-05-13

## 2021-05-13 PROBLEM — E66.9 OBESITY: Status: ACTIVE | Noted: 2021-05-13

## 2021-05-13 PROCEDURE — C1755 CATHETER, INTRASPINAL: HCPCS | Performed by: ORTHOPAEDIC SURGERY

## 2021-05-13 PROCEDURE — 25010000002 ROPIVACAINE PER 1 MG: Performed by: NURSE ANESTHETIST, CERTIFIED REGISTERED

## 2021-05-13 PROCEDURE — C1889 IMPLANT/INSERT DEVICE, NOC: HCPCS | Performed by: ORTHOPAEDIC SURGERY

## 2021-05-13 PROCEDURE — C1713 ANCHOR/SCREW BN/BN,TIS/BN: HCPCS | Performed by: ORTHOPAEDIC SURGERY

## 2021-05-13 PROCEDURE — 25010000002 PROPOFOL 10 MG/ML EMULSION: Performed by: NURSE ANESTHETIST, CERTIFIED REGISTERED

## 2021-05-13 PROCEDURE — 25010000003 MORPHINE PER 10 MG: Performed by: ORTHOPAEDIC SURGERY

## 2021-05-13 PROCEDURE — 97116 GAIT TRAINING THERAPY: CPT

## 2021-05-13 PROCEDURE — 25010000003 CEFAZOLIN IN DEXTROSE 2-4 GM/100ML-% SOLUTION: Performed by: ORTHOPAEDIC SURGERY

## 2021-05-13 PROCEDURE — 25010000002 ONDANSETRON PER 1 MG: Performed by: NURSE ANESTHETIST, CERTIFIED REGISTERED

## 2021-05-13 PROCEDURE — C1776 JOINT DEVICE (IMPLANTABLE): HCPCS | Performed by: ORTHOPAEDIC SURGERY

## 2021-05-13 PROCEDURE — 25010000002 KETOROLAC TROMETHAMINE PER 15 MG: Performed by: ORTHOPAEDIC SURGERY

## 2021-05-13 PROCEDURE — 25010000002 ROPIVACAINE PER 1 MG: Performed by: ORTHOPAEDIC SURGERY

## 2021-05-13 PROCEDURE — 25010000002 DEXAMETHASONE PER 1 MG: Performed by: NURSE ANESTHETIST, CERTIFIED REGISTERED

## 2021-05-13 PROCEDURE — 73560 X-RAY EXAM OF KNEE 1 OR 2: CPT

## 2021-05-13 PROCEDURE — 97161 PT EVAL LOW COMPLEX 20 MIN: CPT

## 2021-05-13 DEVICE — LEGION NARROW POSTERIOR STABILIZED                                    OXINIUM SIZE 4N RIGHT
Type: IMPLANTABLE DEVICE | Site: KNEE | Status: FUNCTIONAL
Brand: LEGION

## 2021-05-13 DEVICE — GENESIS II NON-POROUS TIBIAL                                    BASEPLATE SIZE 3 RIGHT
Type: IMPLANTABLE DEVICE | Site: KNEE | Status: FUNCTIONAL
Brand: GENESIS II

## 2021-05-13 DEVICE — DEV CONTRL TISS STRATAFIX SPIRAL PGPCL 2/0FS 30X30CM: Type: IMPLANTABLE DEVICE | Site: KNEE | Status: FUNCTIONAL

## 2021-05-13 DEVICE — GENESIS II RESURFACING PATELLAR                                    PROSTHESIS  29MM
Type: IMPLANTABLE DEVICE | Site: KNEE | Status: FUNCTIONAL
Brand: GENESIS II

## 2021-05-13 DEVICE — DEV CONTRL TISS STRATAFIX SYMM PDS PLUS VIL CT-1 45CM: Type: IMPLANTABLE DEVICE | Site: KNEE | Status: FUNCTIONAL

## 2021-05-13 DEVICE — LEGION POSTERIOR STABILIZED HIGH                                    FLEX HIGHLY CROSS LINKED                                    POLYETHYLENE SIZE 3-4 9MM
Type: IMPLANTABLE DEVICE | Site: KNEE | Status: FUNCTIONAL
Brand: LEGION

## 2021-05-13 DEVICE — IMPLANTABLE DEVICE: Type: IMPLANTABLE DEVICE | Site: KNEE | Status: FUNCTIONAL

## 2021-05-13 DEVICE — CMT BONE PALACOS R HI/VISC 1X40: Type: IMPLANTABLE DEVICE | Site: KNEE | Status: FUNCTIONAL

## 2021-05-13 RX ORDER — LIDOCAINE HYDROCHLORIDE 10 MG/ML
0.5 INJECTION, SOLUTION EPIDURAL; INFILTRATION; INTRACAUDAL; PERINEURAL ONCE AS NEEDED
Status: COMPLETED | OUTPATIENT
Start: 2021-05-13 | End: 2021-05-13

## 2021-05-13 RX ORDER — LABETALOL HYDROCHLORIDE 5 MG/ML
10 INJECTION, SOLUTION INTRAVENOUS EVERY 4 HOURS PRN
Status: DISCONTINUED | OUTPATIENT
Start: 2021-05-13 | End: 2021-05-14 | Stop reason: HOSPADM

## 2021-05-13 RX ORDER — SODIUM CHLORIDE 0.9 % (FLUSH) 0.9 %
10 SYRINGE (ML) INJECTION EVERY 12 HOURS SCHEDULED
Status: DISCONTINUED | OUTPATIENT
Start: 2021-05-13 | End: 2021-05-13 | Stop reason: HOSPADM

## 2021-05-13 RX ORDER — SODIUM CHLORIDE 0.9 % (FLUSH) 0.9 %
10 SYRINGE (ML) INJECTION AS NEEDED
Status: DISCONTINUED | OUTPATIENT
Start: 2021-05-13 | End: 2021-05-13 | Stop reason: HOSPADM

## 2021-05-13 RX ORDER — ASPIRIN 325 MG
325 TABLET, DELAYED RELEASE (ENTERIC COATED) ORAL DAILY
Status: DISCONTINUED | OUTPATIENT
Start: 2021-05-14 | End: 2021-05-14 | Stop reason: HOSPADM

## 2021-05-13 RX ORDER — FAMOTIDINE 20 MG/1
20 TABLET, FILM COATED ORAL
Status: COMPLETED | OUTPATIENT
Start: 2021-05-13 | End: 2021-05-13

## 2021-05-13 RX ORDER — LIDOCAINE HYDROCHLORIDE 10 MG/ML
INJECTION, SOLUTION EPIDURAL; INFILTRATION; INTRACAUDAL; PERINEURAL AS NEEDED
Status: DISCONTINUED | OUTPATIENT
Start: 2021-05-13 | End: 2021-05-13 | Stop reason: SURG

## 2021-05-13 RX ORDER — OXYCODONE HYDROCHLORIDE 5 MG/1
5 TABLET ORAL EVERY 4 HOURS PRN
Status: DISCONTINUED | OUTPATIENT
Start: 2021-05-13 | End: 2021-05-14 | Stop reason: HOSPADM

## 2021-05-13 RX ORDER — PREGABALIN 75 MG/1
75 CAPSULE ORAL ONCE
Status: COMPLETED | OUTPATIENT
Start: 2021-05-13 | End: 2021-05-13

## 2021-05-13 RX ORDER — MAGNESIUM HYDROXIDE 1200 MG/15ML
LIQUID ORAL AS NEEDED
Status: DISCONTINUED | OUTPATIENT
Start: 2021-05-13 | End: 2021-05-13 | Stop reason: HOSPADM

## 2021-05-13 RX ORDER — MIDAZOLAM HYDROCHLORIDE 1 MG/ML
1 INJECTION INTRAMUSCULAR; INTRAVENOUS
Status: DISCONTINUED | OUTPATIENT
Start: 2021-05-13 | End: 2021-05-13 | Stop reason: HOSPADM

## 2021-05-13 RX ORDER — ACETAMINOPHEN 500 MG
1000 TABLET ORAL ONCE
Status: COMPLETED | OUTPATIENT
Start: 2021-05-13 | End: 2021-05-13

## 2021-05-13 RX ORDER — DEXAMETHASONE SODIUM PHOSPHATE 4 MG/ML
INJECTION, SOLUTION INTRA-ARTICULAR; INTRALESIONAL; INTRAMUSCULAR; INTRAVENOUS; SOFT TISSUE AS NEEDED
Status: DISCONTINUED | OUTPATIENT
Start: 2021-05-13 | End: 2021-05-13 | Stop reason: SURG

## 2021-05-13 RX ORDER — BISACODYL 10 MG
10 SUPPOSITORY, RECTAL RECTAL DAILY PRN
Status: DISCONTINUED | OUTPATIENT
Start: 2021-05-13 | End: 2021-05-14 | Stop reason: HOSPADM

## 2021-05-13 RX ORDER — MELOXICAM 7.5 MG/1
15 TABLET ORAL DAILY
Status: DISCONTINUED | OUTPATIENT
Start: 2021-05-13 | End: 2021-05-14 | Stop reason: HOSPADM

## 2021-05-13 RX ORDER — OXYCODONE HYDROCHLORIDE 5 MG/1
10 TABLET ORAL EVERY 4 HOURS PRN
Status: DISCONTINUED | OUTPATIENT
Start: 2021-05-13 | End: 2021-05-14 | Stop reason: HOSPADM

## 2021-05-13 RX ORDER — DOCUSATE SODIUM 100 MG/1
100 CAPSULE, LIQUID FILLED ORAL 2 TIMES DAILY PRN
Status: DISCONTINUED | OUTPATIENT
Start: 2021-05-13 | End: 2021-05-14 | Stop reason: HOSPADM

## 2021-05-13 RX ORDER — ACETAMINOPHEN 500 MG
1000 TABLET ORAL EVERY 6 HOURS
Status: DISCONTINUED | OUTPATIENT
Start: 2021-05-13 | End: 2021-05-14 | Stop reason: HOSPADM

## 2021-05-13 RX ORDER — SODIUM CHLORIDE, SODIUM LACTATE, POTASSIUM CHLORIDE, CALCIUM CHLORIDE 600; 310; 30; 20 MG/100ML; MG/100ML; MG/100ML; MG/100ML
9 INJECTION, SOLUTION INTRAVENOUS CONTINUOUS PRN
Status: DISCONTINUED | OUTPATIENT
Start: 2021-05-13 | End: 2021-05-14 | Stop reason: HOSPADM

## 2021-05-13 RX ORDER — DIPHENHYDRAMINE HYDROCHLORIDE 50 MG/ML
25 INJECTION INTRAMUSCULAR; INTRAVENOUS EVERY 6 HOURS PRN
Status: DISCONTINUED | OUTPATIENT
Start: 2021-05-13 | End: 2021-05-14 | Stop reason: HOSPADM

## 2021-05-13 RX ORDER — CEFAZOLIN SODIUM 2 G/100ML
2 INJECTION, SOLUTION INTRAVENOUS EVERY 8 HOURS
Status: COMPLETED | OUTPATIENT
Start: 2021-05-13 | End: 2021-05-13

## 2021-05-13 RX ORDER — DIPHENHYDRAMINE HCL 25 MG
25 CAPSULE ORAL EVERY 6 HOURS PRN
Status: DISCONTINUED | OUTPATIENT
Start: 2021-05-13 | End: 2021-05-14 | Stop reason: HOSPADM

## 2021-05-13 RX ORDER — NALOXONE HCL 0.4 MG/ML
0.1 VIAL (ML) INJECTION
Status: DISCONTINUED | OUTPATIENT
Start: 2021-05-13 | End: 2021-05-14 | Stop reason: HOSPADM

## 2021-05-13 RX ORDER — HYDROMORPHONE HYDROCHLORIDE 1 MG/ML
0.5 INJECTION, SOLUTION INTRAMUSCULAR; INTRAVENOUS; SUBCUTANEOUS
Status: DISCONTINUED | OUTPATIENT
Start: 2021-05-13 | End: 2021-05-13 | Stop reason: HOSPADM

## 2021-05-13 RX ORDER — ONDANSETRON 2 MG/ML
INJECTION INTRAMUSCULAR; INTRAVENOUS AS NEEDED
Status: DISCONTINUED | OUTPATIENT
Start: 2021-05-13 | End: 2021-05-13 | Stop reason: SURG

## 2021-05-13 RX ORDER — BUPIVACAINE HYDROCHLORIDE 2.5 MG/ML
INJECTION, SOLUTION EPIDURAL; INFILTRATION; INTRACAUDAL
Status: COMPLETED | OUTPATIENT
Start: 2021-05-13 | End: 2021-05-13

## 2021-05-13 RX ORDER — FENTANYL CITRATE 50 UG/ML
50 INJECTION, SOLUTION INTRAMUSCULAR; INTRAVENOUS
Status: DISCONTINUED | OUTPATIENT
Start: 2021-05-13 | End: 2021-05-13 | Stop reason: HOSPADM

## 2021-05-13 RX ORDER — AMOXICILLIN 250 MG
2 CAPSULE ORAL 2 TIMES DAILY PRN
Status: DISCONTINUED | OUTPATIENT
Start: 2021-05-13 | End: 2021-05-14 | Stop reason: HOSPADM

## 2021-05-13 RX ORDER — BISACODYL 5 MG/1
10 TABLET, DELAYED RELEASE ORAL DAILY PRN
Status: DISCONTINUED | OUTPATIENT
Start: 2021-05-13 | End: 2021-05-14 | Stop reason: HOSPADM

## 2021-05-13 RX ORDER — CEFAZOLIN SODIUM 2 G/100ML
2 INJECTION, SOLUTION INTRAVENOUS ONCE
Status: COMPLETED | OUTPATIENT
Start: 2021-05-13 | End: 2021-05-13

## 2021-05-13 RX ORDER — BUPIVACAINE HYDROCHLORIDE 5 MG/ML
INJECTION, SOLUTION PERINEURAL
Status: COMPLETED | OUTPATIENT
Start: 2021-05-13 | End: 2021-05-13

## 2021-05-13 RX ORDER — KETOROLAC TROMETHAMINE 15 MG/ML
15 INJECTION, SOLUTION INTRAMUSCULAR; INTRAVENOUS EVERY 6 HOURS PRN
Status: DISCONTINUED | OUTPATIENT
Start: 2021-05-13 | End: 2021-05-14 | Stop reason: HOSPADM

## 2021-05-13 RX ORDER — PROPOFOL 10 MG/ML
VIAL (ML) INTRAVENOUS AS NEEDED
Status: DISCONTINUED | OUTPATIENT
Start: 2021-05-13 | End: 2021-05-13 | Stop reason: SURG

## 2021-05-13 RX ORDER — ONDANSETRON 4 MG/1
4 TABLET, FILM COATED ORAL EVERY 6 HOURS PRN
Status: DISCONTINUED | OUTPATIENT
Start: 2021-05-13 | End: 2021-05-14 | Stop reason: HOSPADM

## 2021-05-13 RX ORDER — ONDANSETRON 2 MG/ML
4 INJECTION INTRAMUSCULAR; INTRAVENOUS EVERY 6 HOURS PRN
Status: DISCONTINUED | OUTPATIENT
Start: 2021-05-13 | End: 2021-05-14 | Stop reason: HOSPADM

## 2021-05-13 RX ORDER — SODIUM CHLORIDE 9 MG/ML
100 INJECTION, SOLUTION INTRAVENOUS CONTINUOUS
Status: DISCONTINUED | OUTPATIENT
Start: 2021-05-13 | End: 2021-05-14 | Stop reason: HOSPADM

## 2021-05-13 RX ORDER — LEVOTHYROXINE SODIUM 0.05 MG/1
50 TABLET ORAL
Status: DISCONTINUED | OUTPATIENT
Start: 2021-05-14 | End: 2021-05-14 | Stop reason: HOSPADM

## 2021-05-13 RX ADMIN — ROPIVACAINE HYDROCHLORIDE 10 ML/HR: 5 INJECTION, SOLUTION EPIDURAL; INFILTRATION; PERINEURAL at 09:17

## 2021-05-13 RX ADMIN — OXYCODONE HYDROCHLORIDE 5 MG: 5 TABLET ORAL at 13:31

## 2021-05-13 RX ADMIN — PROPOFOL 50 MG: 10 INJECTION, EMULSION INTRAVENOUS at 07:48

## 2021-05-13 RX ADMIN — CEFAZOLIN SODIUM 2 G: 2 INJECTION, SOLUTION INTRAVENOUS at 15:22

## 2021-05-13 RX ADMIN — CEFAZOLIN SODIUM 2 G: 2 INJECTION, SOLUTION INTRAVENOUS at 07:32

## 2021-05-13 RX ADMIN — LIDOCAINE HYDROCHLORIDE 50 MG: 10 INJECTION, SOLUTION EPIDURAL; INFILTRATION; INTRACAUDAL; PERINEURAL at 07:38

## 2021-05-13 RX ADMIN — PROPOFOL 50 MG: 10 INJECTION, EMULSION INTRAVENOUS at 07:38

## 2021-05-13 RX ADMIN — DEXAMETHASONE SODIUM PHOSPHATE 8 MG: 4 INJECTION, SOLUTION INTRA-ARTICULAR; INTRALESIONAL; INTRAMUSCULAR; INTRAVENOUS; SOFT TISSUE at 07:51

## 2021-05-13 RX ADMIN — BUPIVACAINE HYDROCHLORIDE 2 ML: 5 INJECTION, SOLUTION PERINEURAL at 07:42

## 2021-05-13 RX ADMIN — Medication 1000 MG: at 08:51

## 2021-05-13 RX ADMIN — MUPIROCIN 1 APPLICATION: 20 OINTMENT TOPICAL at 07:26

## 2021-05-13 RX ADMIN — ACETAMINOPHEN 1000 MG: 500 TABLET, FILM COATED ORAL at 19:43

## 2021-05-13 RX ADMIN — ACETAMINOPHEN 1000 MG: 500 TABLET, FILM COATED ORAL at 13:31

## 2021-05-13 RX ADMIN — ACETAMINOPHEN 1000 MG: 500 TABLET ORAL at 07:25

## 2021-05-13 RX ADMIN — PREGABALIN 75 MG: 75 CAPSULE ORAL at 07:25

## 2021-05-13 RX ADMIN — LIDOCAINE HYDROCHLORIDE 0.5 ML: 10 INJECTION, SOLUTION EPIDURAL; INFILTRATION; INTRACAUDAL; PERINEURAL at 07:32

## 2021-05-13 RX ADMIN — Medication 1000 MG: at 07:51

## 2021-05-13 RX ADMIN — ONDANSETRON 4 MG: 2 INJECTION INTRAMUSCULAR; INTRAVENOUS at 08:51

## 2021-05-13 RX ADMIN — BUPIVACAINE HYDROCHLORIDE 30 ML: 2.5 INJECTION, SOLUTION EPIDURAL; INFILTRATION; INTRACAUDAL; PERINEURAL at 09:06

## 2021-05-13 RX ADMIN — KETOROLAC TROMETHAMINE 15 MG: 15 INJECTION, SOLUTION INTRAMUSCULAR; INTRAVENOUS at 16:26

## 2021-05-13 RX ADMIN — FAMOTIDINE 20 MG: 20 TABLET ORAL at 07:32

## 2021-05-13 RX ADMIN — PROPOFOL 75 MCG/KG/MIN: 10 INJECTION, EMULSION INTRAVENOUS at 07:48

## 2021-05-13 RX ADMIN — SODIUM CHLORIDE, POTASSIUM CHLORIDE, SODIUM LACTATE AND CALCIUM CHLORIDE 9 ML/HR: 600; 310; 30; 20 INJECTION, SOLUTION INTRAVENOUS at 07:32

## 2021-05-13 RX ADMIN — MELOXICAM 15 MG: 7.5 TABLET ORAL at 13:31

## 2021-05-13 RX ADMIN — SODIUM CHLORIDE 100 ML/HR: 9 INJECTION, SOLUTION INTRAVENOUS at 09:43

## 2021-05-13 RX ADMIN — OXYCODONE HYDROCHLORIDE 5 MG: 5 TABLET ORAL at 19:43

## 2021-05-13 RX ADMIN — CEFAZOLIN SODIUM 2 G: 2 INJECTION, SOLUTION INTRAVENOUS at 22:42

## 2021-05-13 NOTE — ANESTHESIA PROCEDURE NOTES
Peripheral Block      Patient reassessed immediately prior to procedure    Patient location during procedure: post-op  Reason for block: at surgeon's request and post-op pain management  Performed by  CRNA: Lilly Galvan CRNA  Preanesthetic Checklist  Completed: patient identified, IV checked, site marked, risks and benefits discussed, surgical consent, monitors and equipment checked, pre-op evaluation and timeout performed  Prep:  Pt Position: supine  Sterile barriers:cap, gloves, mask and sterile barriers  Prep: ChloraPrep  Patient monitoring: blood pressure monitoring, continuous pulse oximetry and EKG  Procedure  Performed under: spinal  Guidance:ultrasound guided  Images:still images obtained, printed/placed on chart    Laterality:right  Block Type:adductor canal block  Injection Technique:catheter  Needle Type:Tuohy and echogenic  Needle Gauge:18 G  Resistance on Injection: none  Catheter Size:20 G (20g)  Cath Depth at skin: 10 cm    Medications Used: bupivacaine PF (MARCAINE) 0.25 % injection, 30 mL      Post Assessment  Injection Assessment: negative aspiration for heme, incremental injection and no paresthesia on injection  Patient Tolerance:comfortable throughout block  Complications:no  Additional Notes  Procedure:             The pt was placed in the Supine position.  The Insertion site was  prepped and Draped in sterile fashion.  The pt was anesthetized with  IV Sedation( see meds).  Skin and cutaneous tissue was infiltrated and anesthetized with 1% Lidocaine 3 mls via a 25g needle.  A BBraun 4 inch 18g echogenic needle was then  inserted approximately midline, mid-thigh and advanced In-plane with Ultrasound guidance.  Normal Saline PSF was utilized for hydrodissection of tissue.  The Vastus medialis and Sartorius muscle where visualized and the needle tip was placed in the adductor canal,  lateral to the femoral artery.  LA injection spread was visualized, injection was incremental 1-5ml, injection  pressure was normal or little, no intraneural injection, no vascular injection.  LA dose was injected thru the needle(see dose above).  A BBraun 20g wire stylet catheter was placed via the needle with ultrasound visualization and confirmation with NS fluid bolus. The catheter insertion site was sealed with exofin tissue adhesive. The labeled catheter was then coiled and secured to skin with benzoin,  steristrips and CHG transparent dressing.  Appropriate labels were applied.  Thank you.

## 2021-05-13 NOTE — ANESTHESIA PROCEDURE NOTES
Spinal Block      Patient reassessed immediately prior to procedure    Patient location during procedure: OR  Indication:at surgeon's request  Performed By  CRNA: Jolene Kerns CRNA  Preanesthetic Checklist  Completed: patient identified, IV checked, site marked, risks and benefits discussed, surgical consent, monitors and equipment checked, pre-op evaluation and timeout performed  Spinal Block Prep:  Patient Position:sitting  Sterile Tech:cap, gloves, sterile barriers and mask  Prep:Chloraprep  Patient Monitoring:blood pressure monitoring, continuous pulse oximetry and EKG  Spinal Block Procedure  Approach:midline  Guidance:landmark technique and palpation technique  Location:L4-L5  Needle Type:Sprotte  Needle Gauge:25 G  Placement of Spinal needle event:cerebrospinal fluid aspirated  Paresthesia: no  Fluid Appearance:clear  Medications: bupivacaine (MARCAINE) 0.5 % injection, 2 mL  Med Administered at 5/13/2021 7:42 AM   Post Assessment  Patient Tolerance:patient tolerated the procedure well with no apparent complications  Complications no  Additional Notes  Procedure:  Pt assisted to sitting position, with legs in position of comfort over side of bed.  Pt. instructed in optimal spine presentation, the spine was prepped/ Draped and the skin at insertion site was anesthetized with 1% Lidocaine 2 ml.  The spinal needle was then advanced until CSF flow was obtained and LA was injected:

## 2021-05-13 NOTE — ANESTHESIA POSTPROCEDURE EVALUATION
Patient: Monique Tierney    Procedure Summary     Date: 05/13/21 Room / [facility]:  [facility]  [facility]    Anesthesia Start: 0732 Anesthesia Stop: 0910    Procedure: TOTAL KNEE ARTHROPLASTY RIGHT (Right Knee) Diagnosis:     Surgeons: Fernando German MD Provider: Endy Gonsalez MD    Anesthesia Type: spinal, regional ASA Status: 2          Anesthesia Type: spinal, regional    Vitals  Vitals Value Taken Time   BP     Temp     Pulse 77 05/13/21 0909   Resp     SpO2 95 % 05/13/21 0909   Vitals shown include unvalidated device data.        Post Anesthesia Care and Evaluation    Patient location during evaluation: PACU  Patient participation: complete - patient participated  Level of consciousness: awake and alert  Pain score: 0  Pain management: adequate  Airway patency: patent  Anesthetic complications: No anesthetic complications  PONV Status: none  Cardiovascular status: hemodynamically stable and acceptable  Respiratory status: nonlabored ventilation, acceptable and nasal cannula  Hydration status: acceptable

## 2021-05-13 NOTE — ANESTHESIA PREPROCEDURE EVALUATION
Anesthesia Evaluation     Patient summary reviewed and Nursing notes reviewed   history of anesthetic complications: PONV  NPO Solid Status: > 8 hours  NPO Liquid Status: > 2 hours           Airway   Mallampati: III  TM distance: >3 FB  Neck ROM: full  Possible difficult intubation  Dental - normal exam     Pulmonary     breath sounds clear to auscultation  Cardiovascular   Exercise tolerance: good (4-7 METS)    ECG reviewed  Rhythm: regular  Rate: normal        Neuro/Psych  (+) psychiatric history,     GI/Hepatic/Renal/Endo    (+) obesity,   thyroid problem hypothyroidism  (-) morbid obesity    Musculoskeletal     Abdominal   (+) obese,     Abdomen: soft.   Substance History      OB/GYN          Other   arthritis,                      Anesthesia Plan    ASA 2     spinal and regional     intravenous induction     Anesthetic plan, all risks, benefits, and alternatives have been provided, discussed and informed consent has been obtained with: patient.    Plan discussed with CRNA.

## 2021-05-14 VITALS
HEIGHT: 62 IN | BODY MASS INDEX: 35.28 KG/M2 | HEART RATE: 71 BPM | SYSTOLIC BLOOD PRESSURE: 113 MMHG | OXYGEN SATURATION: 98 % | TEMPERATURE: 98.7 F | RESPIRATION RATE: 18 BRPM | DIASTOLIC BLOOD PRESSURE: 63 MMHG

## 2021-05-14 LAB
ANION GAP SERPL CALCULATED.3IONS-SCNC: 7 MMOL/L (ref 5–15)
BASOPHILS # BLD AUTO: 0.02 10*3/MM3 (ref 0–0.2)
BASOPHILS NFR BLD AUTO: 0.2 % (ref 0–1.5)
BUN SERPL-MCNC: 12 MG/DL (ref 8–23)
BUN/CREAT SERPL: 21.8 (ref 7–25)
CALCIUM SPEC-SCNC: 8.7 MG/DL (ref 8.6–10.5)
CHLORIDE SERPL-SCNC: 106 MMOL/L (ref 98–107)
CO2 SERPL-SCNC: 25 MMOL/L (ref 22–29)
CREAT SERPL-MCNC: 0.55 MG/DL (ref 0.57–1)
DEPRECATED RDW RBC AUTO: 40.8 FL (ref 37–54)
EOSINOPHIL # BLD AUTO: 0 10*3/MM3 (ref 0–0.4)
EOSINOPHIL NFR BLD AUTO: 0 % (ref 0.3–6.2)
ERYTHROCYTE [DISTWIDTH] IN BLOOD BY AUTOMATED COUNT: 12.4 % (ref 12.3–15.4)
GFR SERPL CREATININE-BSD FRML MDRD: 113 ML/MIN/1.73
GLUCOSE SERPL-MCNC: 105 MG/DL (ref 65–99)
HCT VFR BLD AUTO: 34.8 % (ref 34–46.6)
HGB BLD-MCNC: 11.5 G/DL (ref 12–15.9)
IMM GRANULOCYTES # BLD AUTO: 0.06 10*3/MM3 (ref 0–0.05)
IMM GRANULOCYTES NFR BLD AUTO: 0.5 % (ref 0–0.5)
LYMPHOCYTES # BLD AUTO: 1.92 10*3/MM3 (ref 0.7–3.1)
LYMPHOCYTES NFR BLD AUTO: 15 % (ref 19.6–45.3)
MCH RBC QN AUTO: 30 PG (ref 26.6–33)
MCHC RBC AUTO-ENTMCNC: 33 G/DL (ref 31.5–35.7)
MCV RBC AUTO: 90.9 FL (ref 79–97)
MONOCYTES # BLD AUTO: 1.16 10*3/MM3 (ref 0.1–0.9)
MONOCYTES NFR BLD AUTO: 9.1 % (ref 5–12)
NEUTROPHILS NFR BLD AUTO: 75.2 % (ref 42.7–76)
NEUTROPHILS NFR BLD AUTO: 9.6 10*3/MM3 (ref 1.7–7)
NRBC BLD AUTO-RTO: 0 /100 WBC (ref 0–0.2)
PLATELET # BLD AUTO: 203 10*3/MM3 (ref 140–450)
PMV BLD AUTO: 11.7 FL (ref 6–12)
POTASSIUM SERPL-SCNC: 3.8 MMOL/L (ref 3.5–5.2)
RBC # BLD AUTO: 3.83 10*6/MM3 (ref 3.77–5.28)
SODIUM SERPL-SCNC: 138 MMOL/L (ref 136–145)
WBC # BLD AUTO: 12.76 10*3/MM3 (ref 3.4–10.8)

## 2021-05-14 PROCEDURE — 85025 COMPLETE CBC W/AUTO DIFF WBC: CPT | Performed by: ORTHOPAEDIC SURGERY

## 2021-05-14 PROCEDURE — 97535 SELF CARE MNGMENT TRAINING: CPT

## 2021-05-14 PROCEDURE — 80048 BASIC METABOLIC PNL TOTAL CA: CPT | Performed by: ORTHOPAEDIC SURGERY

## 2021-05-14 PROCEDURE — 97116 GAIT TRAINING THERAPY: CPT | Performed by: PHYSICAL THERAPIST

## 2021-05-14 PROCEDURE — 97110 THERAPEUTIC EXERCISES: CPT | Performed by: PHYSICAL THERAPIST

## 2021-05-14 PROCEDURE — 97530 THERAPEUTIC ACTIVITIES: CPT

## 2021-05-14 PROCEDURE — 97165 OT EVAL LOW COMPLEX 30 MIN: CPT

## 2021-05-14 RX ORDER — DOCUSATE SODIUM 100 MG/1
100 CAPSULE, LIQUID FILLED ORAL 2 TIMES DAILY
Qty: 30 CAPSULE | Refills: 0 | Status: SHIPPED | OUTPATIENT
Start: 2021-05-14 | End: 2021-05-29

## 2021-05-14 RX ORDER — ACETAMINOPHEN 500 MG
1000 TABLET ORAL EVERY 8 HOURS
Qty: 42 TABLET | Refills: 0
Start: 2021-05-14 | End: 2021-05-21

## 2021-05-14 RX ORDER — OXYCODONE HYDROCHLORIDE 5 MG/1
5 TABLET ORAL EVERY 4 HOURS PRN
Qty: 20 TABLET | Refills: 0 | Status: SHIPPED | OUTPATIENT
Start: 2021-05-14 | End: 2021-07-14

## 2021-05-14 RX ORDER — ASPIRIN 325 MG
325 TABLET, DELAYED RELEASE (ENTERIC COATED) ORAL DAILY
Qty: 30 TABLET | Refills: 0 | Status: SHIPPED | OUTPATIENT
Start: 2021-05-14 | End: 2021-06-13

## 2021-05-14 RX ADMIN — ASPIRIN 325 MG: 325 TABLET, COATED ORAL at 08:51

## 2021-05-14 RX ADMIN — OXYCODONE HYDROCHLORIDE 5 MG: 5 TABLET ORAL at 06:04

## 2021-05-14 RX ADMIN — LEVOTHYROXINE SODIUM 50 MCG: 50 TABLET ORAL at 06:04

## 2021-05-14 RX ADMIN — OXYCODONE HYDROCHLORIDE 5 MG: 5 TABLET ORAL at 13:30

## 2021-05-14 RX ADMIN — ACETAMINOPHEN 1000 MG: 500 TABLET, FILM COATED ORAL at 02:19

## 2021-05-14 RX ADMIN — ACETAMINOPHEN 1000 MG: 500 TABLET, FILM COATED ORAL at 08:51

## 2021-05-14 RX ADMIN — MELOXICAM 15 MG: 7.5 TABLET ORAL at 08:51

## 2021-05-16 NOTE — PROGRESS NOTES
ZAYNAB Smith    Nerve Cath Post Op Call    Patient Name: Monique Tierney  :  1960  MRN:  [id number]  Date of Discharge: 2021    Nerve Cath Post Op Call:    Catheter Plan:Patient called, No answer. Message left to call [facility] pain service for any questions or complaints  Patient/Family instructed to call ON CALL anesthesia provider for any questions or problems.  Patient Follow Up:

## 2021-06-11 ENCOUNTER — HOME HEALTH ADMISSION (OUTPATIENT)
Dept: HOME HEALTH SERVICES | Facility: HOME HEALTHCARE | Age: 61
End: 2021-06-11

## 2021-06-14 ENCOUNTER — TRANSCRIBE ORDERS (OUTPATIENT)
Dept: HOME HEALTH SERVICES | Facility: HOME HEALTHCARE | Age: 61
End: 2021-06-14

## 2021-06-14 ENCOUNTER — TELEPHONE (OUTPATIENT)
Dept: INTERNAL MEDICINE | Facility: CLINIC | Age: 61
End: 2021-06-14

## 2021-06-14 ENCOUNTER — HOME HEALTH ADMISSION (OUTPATIENT)
Dept: HOME HEALTH SERVICES | Facility: HOME HEALTHCARE | Age: 61
End: 2021-06-14

## 2021-06-14 DIAGNOSIS — Z96.642 AFTERCARE FOLLOWING LEFT HIP JOINT REPLACEMENT SURGERY: Primary | ICD-10-CM

## 2021-06-14 DIAGNOSIS — Z47.1 AFTERCARE FOLLOWING LEFT HIP JOINT REPLACEMENT SURGERY: Primary | ICD-10-CM

## 2021-06-18 ENCOUNTER — HOME CARE VISIT (OUTPATIENT)
Dept: HOME HEALTH SERVICES | Facility: HOME HEALTHCARE | Age: 61
End: 2021-06-18

## 2021-06-18 ENCOUNTER — TELEPHONE (OUTPATIENT)
Dept: INTERNAL MEDICINE | Facility: CLINIC | Age: 61
End: 2021-06-18

## 2021-06-18 VITALS
RESPIRATION RATE: 18 BRPM | HEART RATE: 68 BPM | SYSTOLIC BLOOD PRESSURE: 120 MMHG | WEIGHT: 192 LBS | DIASTOLIC BLOOD PRESSURE: 72 MMHG | TEMPERATURE: 98.2 F | HEIGHT: 62 IN | BODY MASS INDEX: 35.33 KG/M2

## 2021-06-18 PROCEDURE — G0151 HHCP-SERV OF PT,EA 15 MIN: HCPCS

## 2021-06-18 NOTE — TELEPHONE ENCOUNTER
Caller: SHELBI    Relationship: [facility]    Best call back number: [phone]    What orders are you requesting (i.e. lab or imaging): REQUESTING TO SEE PATIENT TODAY FOR PHYSICAL THERAPY INITIAL EVALUATION FOR TOTAL RIGHT KNEE    In what timeframe would the patient need to come in: TODAY ASAP      Additional notes:

## 2021-06-18 NOTE — HOME HEALTH
"Per discharge summary from the hospital:   \"Patient is a pleasant 61 y.o. female presented for scheduled surgery by Dr. German.   Per his note (Monique is a very pleasant 61-year-old female who has struggled with end-stage activity limiting right knee pain secondary to osteoarthritis. X-rays in March revealed severe tricompartmental degenerative changes in a varus knee with complete loss of medial joint space and marginal osteophyte formation. They have failed exhaustive conservative treatment measures including cortisone injections, viscosupplementation injections in January and physical therapy. She takes Advil as needed. After undergoing a shared decision-making process they agreed to proceed with right total knee arthroplasty. Surgical consent form was signed.).   She underwent right total knee arthroplasty under spinal anesthesia, tolerated surgery well, is admitted for further management.\"  Surgery was R TKR 5/13/2021 by Dr. German    Pt has not rec'd any skilled therapy intervention since surgery. She asked for home health care and was denied, but unable to go to outpatient therapy d/t no assist to get to appointments. She was able to received services through this agency and SOC was today, 6/18/2021.   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

## 2021-06-21 ENCOUNTER — HOME CARE VISIT (OUTPATIENT)
Dept: HOME HEALTH SERVICES | Facility: HOME HEALTHCARE | Age: 61
End: 2021-06-21

## 2021-06-21 VITALS
OXYGEN SATURATION: 98 % | DIASTOLIC BLOOD PRESSURE: 72 MMHG | SYSTOLIC BLOOD PRESSURE: 100 MMHG | RESPIRATION RATE: 14 BRPM | TEMPERATURE: 98.2 F | HEART RATE: 70 BPM

## 2021-06-21 PROCEDURE — G0157 HHC PT ASSISTANT EA 15: HCPCS

## 2021-06-22 ENCOUNTER — HOME CARE VISIT (OUTPATIENT)
Dept: HOME HEALTH SERVICES | Facility: HOME HEALTHCARE | Age: 61
End: 2021-06-22

## 2021-06-22 VITALS
SYSTOLIC BLOOD PRESSURE: 109 MMHG | RESPIRATION RATE: 16 BRPM | HEART RATE: 70 BPM | DIASTOLIC BLOOD PRESSURE: 74 MMHG | OXYGEN SATURATION: 98 % | TEMPERATURE: 98.9 F

## 2021-06-22 PROCEDURE — G0151 HHCP-SERV OF PT,EA 15 MIN: HCPCS

## 2021-06-25 ENCOUNTER — HOME CARE VISIT (OUTPATIENT)
Dept: HOME HEALTH SERVICES | Facility: HOME HEALTHCARE | Age: 61
End: 2021-06-25

## 2021-06-25 VITALS
RESPIRATION RATE: 16 BRPM | DIASTOLIC BLOOD PRESSURE: 70 MMHG | TEMPERATURE: 98.8 F | SYSTOLIC BLOOD PRESSURE: 110 MMHG | OXYGEN SATURATION: 97 % | HEART RATE: 70 BPM

## 2021-06-25 LAB — TSH SERPL DL<=0.005 MIU/L-ACNC: 2.54 UIU/ML (ref 0.27–4.2)

## 2021-06-25 PROCEDURE — G0151 HHCP-SERV OF PT,EA 15 MIN: HCPCS

## 2021-06-25 NOTE — HOME HEALTH
Patient relates she saw MD 6/23/21. Plans to begin OP Treatment 7/1/21. Returns to MD in 1 month with possible surgical manipulation.  Today she requests working on car transfer.

## 2021-06-28 ENCOUNTER — HOME CARE VISIT (OUTPATIENT)
Dept: HOME HEALTH SERVICES | Facility: HOME HEALTHCARE | Age: 61
End: 2021-06-28

## 2021-06-28 VITALS
SYSTOLIC BLOOD PRESSURE: 112 MMHG | OXYGEN SATURATION: 98 % | RESPIRATION RATE: 16 BRPM | TEMPERATURE: 98.9 F | DIASTOLIC BLOOD PRESSURE: 72 MMHG | HEART RATE: 74 BPM

## 2021-06-28 PROCEDURE — G0157 HHC PT ASSISTANT EA 15: HCPCS

## 2021-06-29 ENCOUNTER — HOME CARE VISIT (OUTPATIENT)
Dept: HOME HEALTH SERVICES | Facility: HOME HEALTHCARE | Age: 61
End: 2021-06-29

## 2021-06-29 VITALS
OXYGEN SATURATION: 99 % | HEART RATE: 72 BPM | SYSTOLIC BLOOD PRESSURE: 124 MMHG | TEMPERATURE: 98.3 F | DIASTOLIC BLOOD PRESSURE: 80 MMHG

## 2021-06-29 PROCEDURE — G0157 HHC PT ASSISTANT EA 15: HCPCS

## 2021-07-01 ENCOUNTER — HOME CARE VISIT (OUTPATIENT)
Dept: HOME HEALTH SERVICES | Facility: HOME HEALTHCARE | Age: 61
End: 2021-07-01

## 2021-07-01 VITALS
OXYGEN SATURATION: 99 % | SYSTOLIC BLOOD PRESSURE: 118 MMHG | HEART RATE: 78 BPM | DIASTOLIC BLOOD PRESSURE: 80 MMHG | TEMPERATURE: 98.2 F

## 2021-07-01 PROCEDURE — G0151 HHCP-SERV OF PT,EA 15 MIN: HCPCS

## 2021-07-01 NOTE — HOME HEALTH
Pt is being d/c from home care as of 7.1.2021 in stable condition, with improving gait and improving ROM. Pt is transitioning to outpatient physical therapy.

## 2021-07-04 DIAGNOSIS — E03.9 HYPOTHYROIDISM, UNSPECIFIED TYPE: ICD-10-CM

## 2021-07-06 ENCOUNTER — TRANSCRIBE ORDERS (OUTPATIENT)
Dept: PHYSICAL THERAPY | Facility: CLINIC | Age: 61
End: 2021-07-06

## 2021-07-06 ENCOUNTER — TREATMENT (OUTPATIENT)
Dept: PHYSICAL THERAPY | Facility: CLINIC | Age: 61
End: 2021-07-06

## 2021-07-06 DIAGNOSIS — Z96.651 STATUS POST TOTAL RIGHT KNEE REPLACEMENT: Primary | ICD-10-CM

## 2021-07-06 PROCEDURE — 97161 PT EVAL LOW COMPLEX 20 MIN: CPT | Performed by: PHYSICAL THERAPIST

## 2021-07-06 NOTE — PROGRESS NOTES
Physical Therapy Initial Evaluation and Plan of Care      Patient: Monique Tierney   : 1960  Diagnosis/ICD-10 Code:  No primary diagnosis found.  Referring practitioner: Fernando German,*    Subjective Evaluation    History of Present Illness  Date of onset: 2021  Mechanism of injury: Pt reports that she is 8 weeks post op with 3 weeks of HH care. Pt states she did not start HH until 5 weeks post op and may have to have manipulation if she cannot get good ROM. Pt states that bending, walking, or squatting makes pain worse. Pt reports that she is taking advil and icing if pain is bad. Pt reports that she follows up with the surgeon on the  and may schedule manipulation.     Pain  Current pain ratin  At best pain ratin  At worst pain rating: 3  Location: R knee  Quality: dull ache, tight and pulling  Relieving factors: ice and medications  Aggravating factors: stairs, ambulation, squatting, standing and movement  Progression: improved    Social Support  Lives in: multiple-level home  Lives with: parents    Diagnostic Tests  No diagnostic tests performed    Treatments  Previous treatment: medication and home therapy  Current treatment: medication  Patient Goals  Patient goals for therapy: decreased pain, increased motion and increased strength             Objective          Passive Range of Motion     Right Knee   Flexion: 100 degrees     Additional Passive Range of Motion Details  106 degrees of flexion after massage and PROM stretching    Patellar Mobility   Left Knee Patellar tendons within functional limits include the medial, lateral, superior and inferior.     Right Knee Hypomobile in the medial, lateral, superior and inferior patellar tendon(s).     Strength/Myotome Testing     Left Knee   Quadriceps contraction: fair    Right Knee   Quadriceps contraction: poor    Ambulation     Quality of Movement During Gait     Knee    Knee (Right): Positive stiff knee.           Assessment  & Plan     Assessment  Impairments: abnormal gait, abnormal muscle firing, abnormal or restricted ROM, activity intolerance, impaired balance, impaired physical strength, lacks appropriate home exercise program and pain with function  Assessment details: Pt is a 61 year old female that presents to PT 8 weeks post op TKA on the R side. Pt with limited R knee motion, pain, and weakness expected following surgery. Pt is no longer using a RW and living independently. Pt has had 3 weeks of HH but had no PT for 5 weeks. Pt would benefit from PT to help improve R knee motion, strength, and daily function.   Barriers to therapy: Lack of HH treatment after surgery  Prognosis: good  Prognosis details: Short Term Goals (4 weeks)  1. Pt will demonstrate independence with HEP  2. Pt will have PROM of the R knee into flexion equal to 120 degrees.  3. Pt will increase R quad contraction intensity to fair.    Long Term Goals (8 weeks)  1. Pt will have full AROM in the R knee equal to 0 and 120 degrees of ext and flex respectively.   2. Pt will be able to ascend and descend 10 steps without pain in the R knee  3. Pt will be able to perform sit to stand and stand to sit without pain in the R knee.   Functional Limitations: walking, uncomfortable because of pain and stooping  Plan  Therapy options: will be seen for skilled physical therapy services  Planned modality interventions: cryotherapy, thermotherapy (hydrocollator packs), ultrasound and electrical stimulation/Russian stimulation  Planned therapy interventions: body mechanics training, balance/weight-bearing training, flexibility, functional ROM exercises, home exercise program, joint mobilization, manual therapy, motor coordination training, neuromuscular re-education, soft tissue mobilization, strengthening, stretching and therapeutic activities  Frequency: 2x week  Duration in weeks: 8  Treatment plan discussed with: patient        Manual Therapy:         mins   28410;  Therapeutic Exercise:         mins  30910;     Neuromuscular Black:        mins  17797;    Therapeutic Activity:          mins  87654;     Gait Training:           mins  61222;     Ultrasound:          mins  74724;    Electrical Stimulation:         mins  62906 ( );  Dry Needling          mins self-pay    Timed Treatment:      mins   Total Treatment:     49   mins    PT SIGNATURE: Gabe Boyd, PT   DATE TREATMENT INITIATED: 7/6/2021    Initial Certification  Certification Period: 10/4/2021  I certify that the therapy services are furnished while this patient is under my care.  The services outlined above are required by this patient, and will be reviewed every 90 days.     PHYSICIAN: Fernando German MD      DATE:     Please sign and return via fax to [phone].. Thank you, [facility].

## 2021-07-06 NOTE — TELEPHONE ENCOUNTER
Last office visit  03/30/2021  Next scheduled appointment 07/14/2021    Last labs: 06/24/2021   TSH:: 2.54

## 2021-07-07 RX ORDER — LEVOTHYROXINE SODIUM 0.05 MG/1
TABLET ORAL
Qty: 90 TABLET | Refills: 3 | Status: SHIPPED | OUTPATIENT
Start: 2021-07-07 | End: 2022-08-10

## 2021-07-09 ENCOUNTER — TREATMENT (OUTPATIENT)
Dept: PHYSICAL THERAPY | Facility: CLINIC | Age: 61
End: 2021-07-09

## 2021-07-09 DIAGNOSIS — Z96.651 STATUS POST TOTAL RIGHT KNEE REPLACEMENT: Primary | ICD-10-CM

## 2021-07-09 PROCEDURE — 97140 MANUAL THERAPY 1/> REGIONS: CPT | Performed by: PHYSICAL THERAPIST

## 2021-07-09 PROCEDURE — 97110 THERAPEUTIC EXERCISES: CPT | Performed by: PHYSICAL THERAPIST

## 2021-07-09 NOTE — PROGRESS NOTES
Physical Therapy Daily Progress Note      Visit #: 2    Monique Tierney reports 1/10 pain today at rest.  Pt reports that her knee has been feeling good and the outside of the R knee has not been hurting as much or feeling as tight although the inside of the knee is.         Objective Pt present to PT today with no distress at rest     Pt able to reproduce HEP without cues.     Pt with no increased pain with activities in the clinic today.     R knee flexion: 111 degrees.       See Exercise, Manual, and Modality Logs for complete treatment.     Assessment/Plan  Pt is improving motion in the R knee and is doing well with HEP. Pt to progress clinical activities as tolerated and follow up next week. Pt would benefit from PT to help increase R knee motion, strength, and activity tolerance.       Progress per Plan of Care      Visit Diagnosis:    ICD-10-CM ICD-9-CM   1. Status post total right knee replacement  Z96.651 V43.65            Manual Therapy:    18     mins  23998;  Therapeutic Exercise:    24     mins  78950;     Neuromuscular Black:        mins  28539;    Therapeutic Activity:          mins  28557;     Gait Training:           mins  43670;     Ultrasound:          mins  49482;    Electrical Stimulation:         mins  72701 ( );  Dry Needling          mins self-pay  Iontophoresis          mins 87173      Timed Treatment:   42   mins   Total Treatment:     44   mins    Gabe Boyd, PT  Physical Therapist

## 2021-07-12 ENCOUNTER — TREATMENT (OUTPATIENT)
Dept: PHYSICAL THERAPY | Facility: CLINIC | Age: 61
End: 2021-07-12

## 2021-07-12 DIAGNOSIS — Z96.651 STATUS POST TOTAL RIGHT KNEE REPLACEMENT: Primary | ICD-10-CM

## 2021-07-12 PROCEDURE — 97110 THERAPEUTIC EXERCISES: CPT | Performed by: PHYSICAL THERAPIST

## 2021-07-12 PROCEDURE — 97140 MANUAL THERAPY 1/> REGIONS: CPT | Performed by: PHYSICAL THERAPIST

## 2021-07-12 NOTE — PROGRESS NOTES
Physical Therapy Daily Progress Note      Visit #: 3    Monique Tierney reports 1/10 pain today at rest.  Pt reports that the backs of her legs were sore after last session but reports no increased pain. Pt reports that she has begun to notice how bad her L knee is since her R knee is feeling better. Pt states that she is seeing improvement in the R knee.         Objective Pt present to PT today with no distress at rest.     Pt with 110 degrees of R knee flexion.     Pt tolerated activities well today without increased pain in the R knee with WB activities today.       See Exercise, Manual, and Modality Logs for complete treatment.     Assessment/Plan  Pt continues to have decreased flexion in the R knee although her extension is at 0. Pt to continue with PT to help improve R knee flexion, activity tolerance, and WB function.       Progress per Plan of Care      Visit Diagnosis:    ICD-10-CM ICD-9-CM   1. Status post total right knee replacement  Z96.651 V43.65            Manual Therapy:    14     mins  20541;  Therapeutic Exercise:    16     mins  96972;     Neuromuscular Black:        mins  35757;    Therapeutic Activity:          mins  87956;     Gait Training:           mins  75583;     Ultrasound:          mins  59136;    Electrical Stimulation:         mins  85025 ( );  Dry Needling          mins self-pay  Iontophoresis          mins 95608      Timed Treatment:   30   mins   Total Treatment:     47   mins    Gabe Boyd, PT  Physical Therapist

## 2021-07-14 ENCOUNTER — OFFICE VISIT (OUTPATIENT)
Dept: INTERNAL MEDICINE | Facility: CLINIC | Age: 61
End: 2021-07-14

## 2021-07-14 VITALS
WEIGHT: 193 LBS | TEMPERATURE: 98 F | BODY MASS INDEX: 35.51 KG/M2 | DIASTOLIC BLOOD PRESSURE: 62 MMHG | HEIGHT: 62 IN | RESPIRATION RATE: 14 BRPM | HEART RATE: 87 BPM | OXYGEN SATURATION: 98 % | SYSTOLIC BLOOD PRESSURE: 100 MMHG

## 2021-07-14 DIAGNOSIS — R19.5 POSITIVE COLORECTAL CANCER SCREENING USING COLOGUARD TEST: Primary | ICD-10-CM

## 2021-07-14 PROCEDURE — 99214 OFFICE O/P EST MOD 30 MIN: CPT | Performed by: INTERNAL MEDICINE

## 2021-07-14 NOTE — PROGRESS NOTES
Subjective   Monique Tierney is a 61 y.o. female.     Chief Complaint   Patient presents with   • Hypothyroidism       History of Present Illness   Patient here for follow-up.  Fatigue improved prediabetes stable.  Thyroid function improved after medication fatigue resolved after medication levothyroxine.  Hand numbness improved after wrist splint skipped beats with anxiety attack is stable now.  Anxiety also self controlled patient declined medication.  Cologuard positive.    Current Outpatient Medications:   •  levothyroxine (SYNTHROID, LEVOTHROID) 50 MCG tablet, 1 daily po, Disp: 90 tablet, Rfl: 3    The following portions of the patient's history were reviewed and updated as appropriate: allergies, current medications, past family history, past medical history, past social history, past surgical history and problem list.    Review of Systems   Constitutional: Negative.    Respiratory: Negative.    Cardiovascular: Negative.    Gastrointestinal: Negative.    Musculoskeletal: Negative.    Skin: Negative.    Neurological: Negative.    Psychiatric/Behavioral: Negative.        Objective   Physical Exam  Cardiovascular:      Rate and Rhythm: Normal rate and regular rhythm.      Heart sounds: Normal heart sounds.   Pulmonary:      Effort: Pulmonary effort is normal.      Breath sounds: Normal breath sounds.   Abdominal:      General: Bowel sounds are normal.   Musculoskeletal:      Cervical back: Neck supple.   Skin:     General: Skin is warm.   Neurological:      Mental Status: She is alert and oriented to person, place, and time.   Psychiatric:         Mood and Affect: Mood normal.         Behavior: Behavior normal.         All tests have been reviewed.    Assessment/Plan   There are no diagnoses linked to this encounter.          Status post ARTHUR-BSO DUE to fibroids     Other fatigue  improved     Prediabetes  good diet weight loss     Hypothyroidism cont levothyroxine 50 mcg daily     Osteoarthritis of both knees,  s/p right replacement           Former tobacco use     Hand numbness wrist splint improved     Skipped beats racing heart occa with anxiety attack , call if worse     Anxiety self controlled patient declined medication for now     Colonoscopy schedule today for + cologuard         8 weeks after labs

## 2021-07-16 ENCOUNTER — TREATMENT (OUTPATIENT)
Dept: PHYSICAL THERAPY | Facility: CLINIC | Age: 61
End: 2021-07-16

## 2021-07-16 DIAGNOSIS — Z96.651 STATUS POST TOTAL RIGHT KNEE REPLACEMENT: Primary | ICD-10-CM

## 2021-07-16 PROCEDURE — 97110 THERAPEUTIC EXERCISES: CPT | Performed by: PHYSICAL THERAPIST

## 2021-07-16 PROCEDURE — 97140 MANUAL THERAPY 1/> REGIONS: CPT | Performed by: PHYSICAL THERAPIST

## 2021-07-16 NOTE — PROGRESS NOTES
Physical Therapy Daily Progress Note      Visit #: 4    Monique Tierney reports 0/10 pain today at rest.  Pt reports that she went shopping for a little bit yesterday and her knee actually felt much better. Pt reports that her L knee is hurting a lot but hopefully that will pass when her R knee starts feeling better.         Objective Pt present to PT today with no distress at rest.     Pt with 115 degrees of R knee flexion today.     Pt tolerated activities well today without increased pain in the R knee.       See Exercise, Manual, and Modality Logs for complete treatment.     Assessment/Plan  Pt continues to have improved motion in the R knee with improved function and activity tolerance. Pt to continue to progress with activities in the clinic and will progress to WB as tolerated. Pt would benefit from PT to help increase R knee motion, strength, and activity tolerance.       Progress per Plan of Care      Visit Diagnosis:    ICD-10-CM ICD-9-CM   1. Status post total right knee replacement  Z96.651 V43.65            Manual Therapy:    15     mins  51848;  Therapeutic Exercise:    15     mins  92714;     Neuromuscular Black:        mins  96908;    Therapeutic Activity:          mins  13354;     Gait Training:           mins  00762;     Ultrasound:          mins  49369;    Electrical Stimulation:         mins  95765 ( );  Dry Needling          mins self-pay  Iontophoresis          mins 34940      Timed Treatment:   30   mins   Total Treatment:     57   mins    Gabe Boyd, PT  Physical Therapist

## 2021-07-19 ENCOUNTER — TREATMENT (OUTPATIENT)
Dept: PHYSICAL THERAPY | Facility: CLINIC | Age: 61
End: 2021-07-19

## 2021-07-19 DIAGNOSIS — Z96.651 STATUS POST TOTAL RIGHT KNEE REPLACEMENT: Primary | ICD-10-CM

## 2021-07-19 PROCEDURE — 97110 THERAPEUTIC EXERCISES: CPT | Performed by: PHYSICAL THERAPIST

## 2021-07-19 PROCEDURE — 97140 MANUAL THERAPY 1/> REGIONS: CPT | Performed by: PHYSICAL THERAPIST

## 2021-07-19 NOTE — PROGRESS NOTES
Physical Therapy Daily Progress Note      Visit #: 5    Monique Tierney reports 0/10 pain today at rest.  Pt reports that her R knee is feeling pretty good and can tell a big difference in how well it moves and feels. Pt reports that her L knee is bothering her more than her R at this time.         Objective Pt present to PT today with no distress at rest.     Pt with 116 degrees of L knee flexion in supine and 115 degrees on TG.     Pt with no increased pain in the L knee with activities in the clinic today.       See Exercise, Manual, and Modality Logs for complete treatment.     Assessment/Plan  Pt continues to do well with activities to help improve R knee mobility and strength. Pt to continue with PT to help improve L knee motion, stability, and function to return to pain free daily activities.       Progress per Plan of Care      Visit Diagnosis:    ICD-10-CM ICD-9-CM   1. Status post total right knee replacement  Z96.651 V43.65            Manual Therapy:    17     mins  83078;  Therapeutic Exercise:    13     mins  73514;     Neuromuscular Black:        mins  96035;    Therapeutic Activity:          mins  76575;     Gait Training:           mins  58903;     Ultrasound:          mins  10557;    Electrical Stimulation:         mins  43602 ( );  Dry Needling          mins self-pay  Iontophoresis          mins 85444      Timed Treatment:   30   mins   Total Treatment:     54   mins    Gabe Boyd, PT  Physical Therapist

## 2021-07-23 ENCOUNTER — TREATMENT (OUTPATIENT)
Dept: PHYSICAL THERAPY | Facility: CLINIC | Age: 61
End: 2021-07-23

## 2021-07-23 DIAGNOSIS — Z96.651 STATUS POST TOTAL RIGHT KNEE REPLACEMENT: Primary | ICD-10-CM

## 2021-07-23 PROCEDURE — 97110 THERAPEUTIC EXERCISES: CPT | Performed by: PHYSICAL THERAPIST

## 2021-07-23 PROCEDURE — 97140 MANUAL THERAPY 1/> REGIONS: CPT | Performed by: PHYSICAL THERAPIST

## 2021-07-23 NOTE — PROGRESS NOTES
Physical Therapy Daily Progress Note      Visit #: 6    Monique Tierney reports 0/10 pain today at rest.  Pt reports that she has been feeling good but her L knee is still hurting. Pt reports that she has been feeling much better since beginning PT.         Objective Pt present to PT today with no distress at rest.     Pt with 117 degrees of PROM in R knee flexion.     Pt with no increased pain in the R knee with activities today.       See Exercise, Manual, and Modality Logs for complete treatment.     Assessment/Plan  Pt continues to do well with activities in the clinic but up until this point she has concentrated on mobility of the knee. Pt began doing some WB activities today and will progress as tolerated. Pt to continue with PT to help improve L knee mobility, strength, and activity tolerance.       Progress per Plan of Care      Visit Diagnosis:    ICD-10-CM ICD-9-CM   1. Status post total right knee replacement  Z96.651 V43.65            Manual Therapy:    14     mins  66188;  Therapeutic Exercise:    25     mins  73658;     Neuromuscular Black:        mins  24516;    Therapeutic Activity:          mins  39597;     Gait Training:           mins  37751;     Ultrasound:          mins  77463;    Electrical Stimulation:         mins  96595 ( );  Dry Needling          mins self-pay  Iontophoresis          mins 12890      Timed Treatment:   39   mins   Total Treatment:     63   mins    Gabe Boyd, PT  Physical Therapist

## 2021-07-26 ENCOUNTER — TELEPHONE (OUTPATIENT)
Dept: PHYSICAL THERAPY | Facility: CLINIC | Age: 61
End: 2021-07-26

## 2021-08-02 ENCOUNTER — TREATMENT (OUTPATIENT)
Dept: PHYSICAL THERAPY | Facility: CLINIC | Age: 61
End: 2021-08-02

## 2021-08-02 DIAGNOSIS — Z96.651 STATUS POST TOTAL RIGHT KNEE REPLACEMENT: Primary | ICD-10-CM

## 2021-08-02 PROCEDURE — 97110 THERAPEUTIC EXERCISES: CPT | Performed by: PHYSICAL THERAPIST

## 2021-08-02 PROCEDURE — 97112 NEUROMUSCULAR REEDUCATION: CPT | Performed by: PHYSICAL THERAPIST

## 2021-08-02 PROCEDURE — 97140 MANUAL THERAPY 1/> REGIONS: CPT | Performed by: PHYSICAL THERAPIST

## 2021-08-02 NOTE — PROGRESS NOTES
Physical Therapy Daily Progress Note      Visit #: 7    Monique Tierney reports 0/10 pain today at rest.  Pt reports that she went to her surgeon who was pleased with the progress and does not think a manipulation will be necessary now. Pt reports that her L knee has been bothering her more than her R now.         Objective Pt present to PT today with no distress at rest.     Pt with 120 degrees of flexion in the R knee with PROM.     Pt with no increased pain in the R knee with activities.       See Exercise, Manual, and Modality Logs for complete treatment.     Assessment/Plan  Pt continues to improve motion in the R knee and is progressing well with activities to promote functional mobility in the clinic. Pt to continue with PT to help improve R knee motion, strength, and activity tolerance.       Progress per Plan of Care      Visit Diagnosis:    ICD-10-CM ICD-9-CM   1. Status post total right knee replacement  Z96.651 V43.65            Manual Therapy:    12     mins  25878;  Therapeutic Exercise:    28     mins  14300;     Neuromuscular Black:    14    mins  55853;    Therapeutic Activity:          mins  04735;     Gait Training:           mins  20978;     Ultrasound:          mins  35196;    Electrical Stimulation:         mins  90290 ( );  Dry Needling          mins self-pay  Iontophoresis          mins 48908      Timed Treatment:   54   mins   Total Treatment:     64   mins    Gabe Boyd, PT  Physical Therapist

## 2021-08-06 ENCOUNTER — TREATMENT (OUTPATIENT)
Dept: PHYSICAL THERAPY | Facility: CLINIC | Age: 61
End: 2021-08-06

## 2021-08-06 DIAGNOSIS — Z96.651 STATUS POST TOTAL RIGHT KNEE REPLACEMENT: Primary | ICD-10-CM

## 2021-08-06 PROCEDURE — 97110 THERAPEUTIC EXERCISES: CPT | Performed by: PHYSICAL THERAPIST

## 2021-08-06 PROCEDURE — 97140 MANUAL THERAPY 1/> REGIONS: CPT | Performed by: PHYSICAL THERAPIST

## 2021-08-06 PROCEDURE — 97112 NEUROMUSCULAR REEDUCATION: CPT | Performed by: PHYSICAL THERAPIST

## 2021-08-06 NOTE — PROGRESS NOTES
Physical Therapy Daily Progress Note      Visit #: 8    Monique Tierney reports 0/10 pain today at rest.  Pt reports that her R knee is feeling very good although her L leg was swollen right below the knee this week and she has an aching pain in the L shin. Pt reports that her R knee has not pain with walking.         Objective Pt present to PT today with no distress at rest.     Pt with 121 degrees of R knee flexion.     Pt with no increased pain in the R knee with activities today.       See Exercise, Manual, and Modality Logs for complete treatment.     Assessment/Plan  Pt continues to have good response to PT and will continue to progress functional exercises as able with her L knee limiting her due to pain. Pt to continue with PT to help increase R knee motion, strength, and functional mobility.       Progress per Plan of Care      Visit Diagnosis:    ICD-10-CM ICD-9-CM   1. Status post total right knee replacement  Z96.651 V43.65            Manual Therapy:    9     mins  81632;  Therapeutic Exercise:    20     mins  46745;     Neuromuscular Black:    13    mins  36949;    Therapeutic Activity:          mins  95809;     Gait Training:           mins  56075;     Ultrasound:          mins  70893;    Electrical Stimulation:         mins  67768 ( );  Dry Needling          mins self-pay  Iontophoresis          mins 20626      Timed Treatment:   42   mins   Total Treatment:     45   mins    Gabe Boyd, PT  Physical Therapist

## 2021-08-10 ENCOUNTER — TREATMENT (OUTPATIENT)
Dept: PHYSICAL THERAPY | Facility: CLINIC | Age: 61
End: 2021-08-10

## 2021-08-10 DIAGNOSIS — Z96.651 STATUS POST TOTAL RIGHT KNEE REPLACEMENT: Primary | ICD-10-CM

## 2021-08-10 PROCEDURE — 97140 MANUAL THERAPY 1/> REGIONS: CPT | Performed by: PHYSICAL THERAPIST

## 2021-08-10 PROCEDURE — 97110 THERAPEUTIC EXERCISES: CPT | Performed by: PHYSICAL THERAPIST

## 2021-08-10 NOTE — PROGRESS NOTES
Physical Therapy Daily Progress Note      Visit #: 9    Monique Tierney reports 0/10 pain today at rest.  Pt reports that her R knee is feeling good but her L knee continues to give her some pain.         Objective Pt present to PT today with no distress at rest.     Pt with 123 degrees of R knee flexion passively.     Pt with no increased pain in the R knee with activities in the clinic today.       See Exercise, Manual, and Modality Logs for complete treatment.     Assessment/Plan  Pt is being somewhat limited by her L knee pain. Pt is moving leg lifts to HEP only and will progress with WB and functional exercises in the clinic as possible. Pt to continue with PT to help increase R knee strength, stability, and pain free function.       Progress per Plan of Care      Visit Diagnosis:    ICD-10-CM ICD-9-CM   1. Status post total right knee replacement  Z96.651 V43.65            Manual Therapy:    14     mins  28338;  Therapeutic Exercise:    16     mins  98005;     Neuromuscular Black:        mins  51112;    Therapeutic Activity:          mins  69666;     Gait Training:           mins  24947;     Ultrasound:          mins  63966;    Electrical Stimulation:         mins  76484 ( );  Dry Needling          mins self-pay  Iontophoresis          mins 41119      Timed Treatment:   30   mins   Total Treatment:     52   mins    Gabe Boyd, PT  Physical Therapist

## 2021-09-15 ENCOUNTER — OFFICE VISIT (OUTPATIENT)
Dept: INTERNAL MEDICINE | Facility: CLINIC | Age: 61
End: 2021-09-15

## 2021-09-15 VITALS
OXYGEN SATURATION: 99 % | WEIGHT: 197 LBS | BODY MASS INDEX: 36.25 KG/M2 | TEMPERATURE: 97.8 F | HEART RATE: 88 BPM | HEIGHT: 62 IN | SYSTOLIC BLOOD PRESSURE: 112 MMHG | DIASTOLIC BLOOD PRESSURE: 72 MMHG

## 2021-09-15 DIAGNOSIS — S86.892A LEFT MEDIAL TIBIAL STRESS SYNDROME, INITIAL ENCOUNTER: ICD-10-CM

## 2021-09-15 DIAGNOSIS — M17.0 OSTEOARTHRITIS OF BOTH KNEES, UNSPECIFIED OSTEOARTHRITIS TYPE: Primary | ICD-10-CM

## 2021-09-15 PROCEDURE — 99214 OFFICE O/P EST MOD 30 MIN: CPT | Performed by: INTERNAL MEDICINE

## 2021-09-15 NOTE — PROGRESS NOTES
Subjective   Monique Tierney is a 61 y.o. female.     Chief Complaint   Patient presents with   • Leg Pain     left leg; 2 months; shin       History of Present Illness   Patient complaints left pain and also popliteal pain.  Patient had right knee joint surgery and also has knee arthritis.  Denies any injury.  Pain has been there for 2 months.  Long standing make it worse.  Patient states the pain started after doing right knee joint physical therapy after the surgery.    Current Outpatient Medications:   •  levothyroxine (SYNTHROID, LEVOTHROID) 50 MCG tablet, 1 daily po, Disp: 90 tablet, Rfl: 3    The following portions of the patient's history were reviewed and updated as appropriate: allergies, current medications, past family history, past medical history, past social history, past surgical history and problem list.    Review of Systems   Constitutional: Negative.    Respiratory: Negative.    Cardiovascular: Negative.    Gastrointestinal: Negative.    Musculoskeletal: Positive for arthralgias and myalgias.   Skin: Negative.    Neurological: Negative.    Psychiatric/Behavioral: Negative.        Objective   Physical Exam  Cardiovascular:      Rate and Rhythm: Normal rate and regular rhythm.      Heart sounds: Normal heart sounds.   Pulmonary:      Effort: Pulmonary effort is normal.      Breath sounds: Normal breath sounds.   Abdominal:      General: Bowel sounds are normal.   Musculoskeletal:         General: Tenderness ( Left shin tenderness and mild popliteal tenderness) present.      Cervical back: Neck supple.   Skin:     General: Skin is warm.   Neurological:      Mental Status: She is alert and oriented to person, place, and time.         All tests have been reviewed.    Assessment/Plan   Diagnoses and all orders for this visit:    Osteoarthritis of both knees, unspecified osteoarthritis type follow-up orthopedist possible knee joint replacement on the left side status post right knee joint replacement  already.  Continue Aleve.    Left medial tibial stress syndrome, rest, warm compress massage Aleve  Call if no better

## 2022-01-27 ENCOUNTER — APPOINTMENT (OUTPATIENT)
Dept: PREADMISSION TESTING | Facility: HOSPITAL | Age: 62
End: 2022-01-27

## 2022-02-08 ENCOUNTER — APPOINTMENT (OUTPATIENT)
Dept: PREADMISSION TESTING | Facility: HOSPITAL | Age: 62
End: 2022-02-08

## 2022-02-25 ENCOUNTER — TELEPHONE (OUTPATIENT)
Dept: INTERNAL MEDICINE | Facility: CLINIC | Age: 62
End: 2022-02-25

## 2022-02-25 NOTE — TELEPHONE ENCOUNTER
Caller: Monique Tierney    Relationship: Self    Best call back number: [phone]    Who is your current provider: DR LUNDBERG    Who would you like your new provider to be: DR PADRON    What are your reasons for transferring care: PT WILL LIKE TO SPEAK WITH FEMALE PROVIDER IN REGARDS TO FEMALE ISSUES    Additional notes:

## 2022-02-25 NOTE — TELEPHONE ENCOUNTER
Spoke with patient. She will stay with dr spears for now as dr vermeesch is not taking on any new patients and she is ok with seeing a nurse practitioner if any female issues arise.

## 2022-03-04 ENCOUNTER — OFFICE VISIT (OUTPATIENT)
Dept: SURGERY | Facility: CLINIC | Age: 62
End: 2022-03-04

## 2022-03-04 VITALS
DIASTOLIC BLOOD PRESSURE: 68 MMHG | SYSTOLIC BLOOD PRESSURE: 120 MMHG | BODY MASS INDEX: 36.14 KG/M2 | TEMPERATURE: 98.4 F | HEIGHT: 63 IN | WEIGHT: 204 LBS | HEART RATE: 100 BPM | OXYGEN SATURATION: 97 % | RESPIRATION RATE: 16 BRPM

## 2022-03-04 DIAGNOSIS — Z01.818 PRE-OP TESTING: Primary | ICD-10-CM

## 2022-03-04 DIAGNOSIS — K82.8 BILIARY DYSKINESIA: Primary | ICD-10-CM

## 2022-03-04 PROCEDURE — 99213 OFFICE O/P EST LOW 20 MIN: CPT | Performed by: SURGERY

## 2022-03-10 ENCOUNTER — TELEPHONE (OUTPATIENT)
Dept: SURGERY | Facility: CLINIC | Age: 62
End: 2022-03-10

## 2022-03-10 NOTE — TELEPHONE ENCOUNTER
Provider:     Caller: LYNETTE VILLALPANDO    Relationship to Patient: SELF    Phone Number: [phone]    Reason for Call: PT HAS GALLBLADDER REMOVAL SURGERY SCHEDULED FOR Tuesday AND HAS ORDERS TO GET COVID SCREENING ON Saturday. PT IS CONCERNED THAT THERE COULD BE BAD WEATHER AND WANTS TO KNOW IF THERE IS ANOTHER DATE/TIME SHE CAN GET THE COVID TEST AS SHE CANNOT DRIVE IN [place].

## 2022-03-10 NOTE — TELEPHONE ENCOUNTER
Pt notified to call if she can't do covid test on Saturday to all and we could do a rapid on Monday.

## 2022-03-12 ENCOUNTER — LAB (OUTPATIENT)
Dept: LAB | Facility: HOSPITAL | Age: 62
End: 2022-03-12

## 2022-03-12 DIAGNOSIS — Z01.818 PRE-OP TESTING: ICD-10-CM

## 2022-03-12 PROCEDURE — C9803 HOPD COVID-19 SPEC COLLECT: HCPCS

## 2022-03-12 PROCEDURE — U0004 COV-19 TEST NON-CDC HGH THRU: HCPCS

## 2022-03-13 LAB — SARS-COV-2 RNA NOSE QL NAA+PROBE: NOT DETECTED

## 2022-03-15 ENCOUNTER — OUTSIDE FACILITY SERVICE (OUTPATIENT)
Dept: SURGERY | Facility: CLINIC | Age: 62
End: 2022-03-15

## 2022-03-15 DIAGNOSIS — K81.9 CHOLECYSTITIS: Primary | ICD-10-CM

## 2022-03-15 PROCEDURE — 47563 LAPARO CHOLECYSTECTOMY/GRAPH: CPT | Performed by: SURGERY

## 2022-03-15 RX ORDER — HYDROCODONE BITARTRATE AND ACETAMINOPHEN 7.5; 325 MG/1; MG/1
1 TABLET ORAL EVERY 6 HOURS PRN
Qty: 14 TABLET | Refills: 0 | Status: SHIPPED | OUTPATIENT
Start: 2022-03-15

## 2022-03-22 NOTE — PROGRESS NOTES
"Patient: Monique Tierney    YOB: 1960    Date: 03/23/2022    Primary Care Provider: Sebastian Ceballos MD    Chief Complaint   Patient presents with   • Post-op Follow-up     Lap chelsea       History of present illness:  I saw the patient in the office today as a followup from their recent laparoscopic cholecystectomy. They state that they have done well and are having no complaints.    Vital Signs:   Vitals:    03/23/22 1059   BP: 122/68   Pulse: 75   Resp: 18   Temp: 97.3 °F (36.3 °C)   TempSrc: Temporal   SpO2: 99%   Height: 160 cm (63\")       Physical Exam:   General Appearance:    Alert, cooperative, in no acute distress   Abdomen:     no masses, no organomegaly, soft non-tender, non-distended, no guarding, wounds are well healed     Assessment / Plan :    1. Postoperative visit        I did discuss the situation with the patient today in the office and they have done well from their recent laparoscopic cholecystectomy.  I have released the patient back to normal activity, they understand that they need to be careful about heavy lifting.  I need to see the patient back in the office only if they are having further problems, they know to call me if they are.    Electronically signed by Ezra Iniguez MD  03/23/22                  "

## 2022-03-23 ENCOUNTER — OFFICE VISIT (OUTPATIENT)
Dept: SURGERY | Facility: CLINIC | Age: 62
End: 2022-03-23

## 2022-03-23 VITALS
BODY MASS INDEX: 36.14 KG/M2 | DIASTOLIC BLOOD PRESSURE: 68 MMHG | TEMPERATURE: 97.3 F | HEIGHT: 63 IN | RESPIRATION RATE: 18 BRPM | HEART RATE: 75 BPM | SYSTOLIC BLOOD PRESSURE: 122 MMHG | OXYGEN SATURATION: 99 %

## 2022-03-23 DIAGNOSIS — Z48.89 POSTOPERATIVE VISIT: Primary | ICD-10-CM

## 2022-03-23 PROCEDURE — 99024 POSTOP FOLLOW-UP VISIT: CPT | Performed by: SURGERY

## 2022-04-05 DIAGNOSIS — M17.0 OSTEOARTHRITIS OF BOTH KNEES, UNSPECIFIED OSTEOARTHRITIS TYPE: ICD-10-CM

## 2022-04-05 RX ORDER — CELECOXIB 200 MG/1
CAPSULE ORAL
Qty: 30 CAPSULE | Refills: 0 | OUTPATIENT
Start: 2022-04-05

## 2022-08-10 DIAGNOSIS — E03.9 HYPOTHYROIDISM, UNSPECIFIED TYPE: ICD-10-CM

## 2022-08-10 RX ORDER — LEVOTHYROXINE SODIUM 50 UG/1
TABLET ORAL
Qty: 30 TABLET | Refills: 1 | Status: SHIPPED | OUTPATIENT
Start: 2022-08-10

## 2022-08-10 NOTE — TELEPHONE ENCOUNTER
Rx Refill Note  Requested Prescriptions     Pending Prescriptions Disp Refills   • Euthyrox 50 MCG tablet [Pharmacy Med Name: Euthyrox 50 MCG Oral Tablet] 90 tablet 0     Sig: Take 1 tablet by mouth once daily      Last office visit with prescribing clinician: 9/15/2021      Next office visit with prescribing clinician: Visit date not found            Abena Knight MA  08/10/22, 14:14 EDT     Attempted to contact patient; left detailed message per notifying patient that appointment was needed.     Prescription adjusted to reflect 30 days with 1 refills until scheduled appointment and then year's supply will be given

## 2023-02-01 ENCOUNTER — OFFICE VISIT (OUTPATIENT)
Dept: SURGERY | Facility: CLINIC | Age: 63
End: 2023-02-01
Payer: MEDICAID

## 2023-02-01 VITALS
SYSTOLIC BLOOD PRESSURE: 128 MMHG | DIASTOLIC BLOOD PRESSURE: 68 MMHG | BODY MASS INDEX: 37.35 KG/M2 | TEMPERATURE: 96.8 F | HEART RATE: 78 BPM | OXYGEN SATURATION: 96 % | RESPIRATION RATE: 18 BRPM | HEIGHT: 63 IN | WEIGHT: 210.8 LBS

## 2023-02-01 DIAGNOSIS — R11.14 BILIOUS VOMITING WITH NAUSEA: ICD-10-CM

## 2023-02-01 DIAGNOSIS — K21.9 GASTROESOPHAGEAL REFLUX DISEASE, UNSPECIFIED WHETHER ESOPHAGITIS PRESENT: Primary | ICD-10-CM

## 2023-02-01 PROCEDURE — 99213 OFFICE O/P EST LOW 20 MIN: CPT | Performed by: SURGERY

## 2023-02-01 RX ORDER — IBUPROFEN 200 MG
200 TABLET ORAL EVERY 6 HOURS PRN
COMMUNITY

## 2023-02-02 PROBLEM — K21.9 GASTROESOPHAGEAL REFLUX DISEASE: Status: ACTIVE | Noted: 2023-02-02

## 2023-02-02 PROBLEM — R11.14 BILIOUS VOMITING WITH NAUSEA: Status: ACTIVE | Noted: 2023-02-02

## 2023-02-07 ENCOUNTER — TELEPHONE (OUTPATIENT)
Dept: SURGERY | Facility: CLINIC | Age: 63
End: 2023-02-07
Payer: MEDICAID

## 2023-02-07 NOTE — PRE-PROCEDURE INSTRUCTIONS
PAT phone history completed with pt for upcoming procedure on 2/10/23, with Dr. Iniguez.     PAT PASS GIVEN/REVIEWED WITH PT.  VERBALIZED UNDERSTANDING OF THE FOLLOWING:  DO NOT EAT, DRINK, SMOKE, USE SMOKELESS TOBACCO OR CHEW GUM AFTER MIDNIGHT THE NIGHT BEFORE SURGERY.  THIS ALSO INCLUDES HARD CANDIES AND MINTS.    DO NOT SHAVE THE AREA TO BE OPERATED ON AT LEAST 48 HOURS PRIOR TO THE PROCEDURE.  DO NOT WEAR MAKE UP OR NAIL POLISH.  DO NOT LEAVE IN ANY PIERCING OR WEAR JEWELRY THE DAY OF SURGERY.      DO NOT USE ADHESIVES IF YOU WEAR DENTURES.    DO NOT WEAR EYE CONTACTS; BRING IN YOUR GLASSES.    ONLY TAKE MEDICATION THE MORNING OF YOUR PROCEDURE IF INSTRUCTED BY YOUR SURGEON WITH ENOUGH WATER TO SWALLOW THE MEDICATION.  IF YOUR SURGEON DID NOT SPECIFY WHICH MEDICATIONS TO TAKE, YOU WILL NEED TO CALL THEIR OFFICE FOR FURTHER INSTRUCTIONS AND DO AS THEY INSTRUCT.    LEAVE ANYTHING YOU CONSIDER VALUABLE AT HOME.    YOU WILL NEED TO ARRANGE FOR SOMEONE TO DRIVE YOU HOME AFTER SURGERY.  IT IS RECOMMENDED THAT YOU DO NOT DRIVE, WORK, DRINK ALCOHOL OR MAKE MAJOR DECISIONS FOR AT LEAST 24 HOURS AFTER YOUR PROCEDURE IS COMPLETE.      THE DAY OF YOUR PROCEDURE, BRING IN THE FOLLOWING IF APPLICABLE:   PICTURE ID AND INSURANCE/MEDICARE OR MEDICAID CARDS/ANY CO-PAY THAT MAY BE DUE   COPY OF ADVANCED DIRECTIVE/LIVING WILL/POWER OR    CPAP/BIPAP/INHALERS   SKIN PREP SHEET   YOUR PREADMISSION TESTING PASS (IF NOT A PHONE HISTORY)

## 2023-02-10 ENCOUNTER — ANESTHESIA (OUTPATIENT)
Dept: GASTROENTEROLOGY | Facility: HOSPITAL | Age: 63
End: 2023-02-10
Payer: MEDICAID

## 2023-02-10 ENCOUNTER — HOSPITAL ENCOUNTER (OUTPATIENT)
Facility: HOSPITAL | Age: 63
Setting detail: HOSPITAL OUTPATIENT SURGERY
Discharge: HOME | End: 2023-02-10
Attending: SURGERY | Admitting: SURGERY
Payer: MEDICAID

## 2023-02-10 ENCOUNTER — ANESTHESIA EVENT (OUTPATIENT)
Dept: GASTROENTEROLOGY | Facility: HOSPITAL | Age: 63
End: 2023-02-10
Payer: MEDICAID

## 2023-02-10 VITALS
SYSTOLIC BLOOD PRESSURE: 116 MMHG | WEIGHT: 175 LBS | HEIGHT: 62 IN | BODY MASS INDEX: 32.2 KG/M2 | RESPIRATION RATE: 18 BRPM | OXYGEN SATURATION: 96 % | TEMPERATURE: 98 F | HEART RATE: 67 BPM | DIASTOLIC BLOOD PRESSURE: 89 MMHG

## 2023-02-10 DIAGNOSIS — R11.14 BILIOUS VOMITING WITH NAUSEA: ICD-10-CM

## 2023-02-10 DIAGNOSIS — K21.9 GASTROESOPHAGEAL REFLUX DISEASE, UNSPECIFIED WHETHER ESOPHAGITIS PRESENT: ICD-10-CM

## 2023-02-10 PROCEDURE — 25010000002 PROPOFOL 1000 MG/100ML EMULSION: Performed by: NURSE ANESTHETIST, CERTIFIED REGISTERED

## 2023-02-10 PROCEDURE — 43239 EGD BIOPSY SINGLE/MULTIPLE: CPT | Performed by: SURGERY

## 2023-02-10 RX ORDER — ONDANSETRON 2 MG/ML
4 INJECTION INTRAMUSCULAR; INTRAVENOUS ONCE AS NEEDED
Status: DISCONTINUED | OUTPATIENT
Start: 2023-02-10 | End: 2023-02-10 | Stop reason: HOSPADM

## 2023-02-10 RX ORDER — SODIUM CHLORIDE, SODIUM LACTATE, POTASSIUM CHLORIDE, CALCIUM CHLORIDE 600; 310; 30; 20 MG/100ML; MG/100ML; MG/100ML; MG/100ML
1000 INJECTION, SOLUTION INTRAVENOUS CONTINUOUS
Status: DISCONTINUED | OUTPATIENT
Start: 2023-02-10 | End: 2023-02-10 | Stop reason: HOSPADM

## 2023-02-10 RX ORDER — OMEPRAZOLE 40 MG/1
40 CAPSULE, DELAYED RELEASE ORAL DAILY
Qty: 30 CAPSULE | Refills: 5 | Status: SHIPPED | OUTPATIENT
Start: 2023-02-10 | End: 2024-02-10

## 2023-02-10 RX ORDER — LIDOCAINE HYDROCHLORIDE 20 MG/ML
INJECTION, SOLUTION INTRAVENOUS AS NEEDED
Status: DISCONTINUED | OUTPATIENT
Start: 2023-02-10 | End: 2023-02-10 | Stop reason: SURG

## 2023-02-10 RX ORDER — PROPOFOL 10 MG/ML
INJECTION, EMULSION INTRAVENOUS AS NEEDED
Status: DISCONTINUED | OUTPATIENT
Start: 2023-02-10 | End: 2023-02-10 | Stop reason: SURG

## 2023-02-10 RX ADMIN — SODIUM CHLORIDE, POTASSIUM CHLORIDE, SODIUM LACTATE AND CALCIUM CHLORIDE 1000 ML: 600; 310; 30; 20 INJECTION, SOLUTION INTRAVENOUS at 09:15

## 2023-02-10 RX ADMIN — LIDOCAINE HYDROCHLORIDE 50 MG: 20 INJECTION, SOLUTION INTRAVENOUS at 09:56

## 2023-02-10 RX ADMIN — PROPOFOL 50 MG: 10 INJECTION, EMULSION INTRAVENOUS at 09:56

## 2023-02-10 NOTE — ANESTHESIA PREPROCEDURE EVALUATION
Anesthesia Evaluation     Patient summary reviewed and Nursing notes reviewed   history of anesthetic complications: PONV  NPO Solid Status: > 8 hours  NPO Liquid Status: > 4 hours           Airway   Mallampati: III  TM distance: >3 FB  Neck ROM: full  Possible difficult intubation  Dental - normal exam     Pulmonary     breath sounds clear to auscultation  Cardiovascular   Exercise tolerance: good (4-7 METS)    ECG reviewed  Rhythm: regular  Rate: normal        Neuro/Psych  (+) psychiatric history Anxiety,    GI/Hepatic/Renal/Endo    (+) obesity,  GERD,  thyroid problem hypothyroidism    Musculoskeletal     Abdominal   (+) obese,     Abdomen: soft.   Substance History      OB/GYN          Other   arthritis,                        Anesthesia Plan    ASA 2     MAC     intravenous induction     Anesthetic plan, risks, benefits, and alternatives have been provided, discussed and informed consent has been obtained with: patient.  Pre-procedure education provided  Plan discussed with CRNA.

## 2023-02-10 NOTE — ANESTHESIA POSTPROCEDURE EVALUATION
Patient: Monique Tierney    Procedure Summary     Date: 02/10/23 Room / Location: [facility] ENDOSCOPY 2 / [facility] ENDOSCOPY    Anesthesia Start: 0952 Anesthesia Stop: 1001    Procedure: ESOPHAGOGASTRODUODENOSCOPY with biopsy (Esophagus) Diagnosis:       Gastroesophageal reflux disease, unspecified whether esophagitis present      Bilious vomiting with nausea      (Gastroesophageal reflux disease, unspecified whether esophagitis present [K21.9])      (Bilious vomiting with nausea [R11.14])    Surgeons: Ezra Iniguez MD Provider: Edmond Mtz CRNA    Anesthesia Type: MAC ASA Status: 2          Anesthesia Type: MAC    Vitals  No vitals data found for the desired time range.          Post Anesthesia Care and Evaluation    Patient location during evaluation: bedside  Patient participation: complete - patient participated  Level of consciousness: awake  Pain score: 0  Pain management: adequate    Airway patency: patent  Anesthetic complications: No anesthetic complications  PONV Status: controlled  Cardiovascular status: acceptable and stable  Respiratory status: acceptable and room air  Hydration status: acceptable    Comments: Vital signs as noted in nursing documentation as per protocol

## 2023-02-15 LAB — REF LAB TEST METHOD: NORMAL

## 2023-02-21 NOTE — PROGRESS NOTES
Patient: Monique Tierney    YOB: 1960    Date: 02/24/2023    Primary Care Provider: Lin Jara MD    Reason for Consultation: Follow-up EGD    Chief Complaint   Patient presents with   • Follow-up     EGD       History of present illness:  I saw the patient in the office today as a followup from their recent EGD with biopsy, the pathology report did show chronic gastritis.  They state that they have done well.  Patient has significant reflux but no burning.  On PPI medication with some improvement.  Patient otherwise doing well with no other complaints.    The following portions of the patient's history were reviewed and updated as appropriate: allergies, current medications, past family history, past medical history, past social history, past surgical history and problem list.    Review of Systems   Constitutional: Negative for chills, fever and unexpected weight change.   HENT: Negative for hearing loss, trouble swallowing and voice change.    Eyes: Negative for visual disturbance.   Respiratory: Negative for apnea, cough, chest tightness, shortness of breath and wheezing.    Cardiovascular: Negative for chest pain, palpitations and leg swelling.   Gastrointestinal: Negative for abdominal distention, abdominal pain, anal bleeding, blood in stool, constipation, diarrhea, nausea, rectal pain and vomiting.   Endocrine: Negative for cold intolerance and heat intolerance.   Genitourinary: Negative for difficulty urinating, dysuria and flank pain.   Musculoskeletal: Negative for back pain and gait problem.   Skin: Negative for color change, rash and wound.   Neurological: Negative for dizziness, syncope, speech difficulty, weakness, light-headedness, numbness and headaches.   Hematological: Negative for adenopathy. Does not bruise/bleed easily.   Psychiatric/Behavioral: Negative for confusion. The patient is not nervous/anxious.        Vital Signs:   Vitals:    02/24/23 1325   BP: 128/64   Pulse: 54  "  Resp: 18   Temp: 98 °F (36.7 °C)   TempSrc: Oral   SpO2: 100%   Weight: 79.4 kg (175 lb)   Height: 157.5 cm (62\")       Allergies:  Allergies   Allergen Reactions   • Garlic GI Intolerance       Medications:    Current Outpatient Medications:   •  Euthyrox 50 MCG tablet, Take 1 tablet by mouth once daily (Patient taking differently: Take 50 mcg by mouth Daily.), Disp: 30 tablet, Rfl: 1  •  HYDROcodone-acetaminophen (Norco) 7.5-325 MG per tablet, Take 1 tablet by mouth Every 6 (Six) Hours As Needed for Severe Pain ., Disp: 14 tablet, Rfl: 0  •  ibuprofen (ADVIL,MOTRIN) 200 MG tablet, Take 200 mg by mouth Every 6 (Six) Hours As Needed for Mild Pain., Disp: , Rfl:   •  omeprazole (priLOSEC) 40 MG capsule, Take 1 capsule by mouth Daily., Disp: 30 capsule, Rfl: 5    Physical Exam:   General Appearance:    Alert, cooperative, in no acute distress   Abdomen:     no masses, no organomegaly, soft non-tender, non-distended, no guarding, no abdominal hernias or masses.   Chest:      Clear toausculation            Cor:  Regular rate and rhythm      Results Review:   I reviewed the patient's new clinical results.    Review of Systems was reviewed and confirmed as accurate as documented by the MA.    Assessment / Plan:    1. Gastroesophageal reflux disease with esophagitis without hemorrhage    2. Epigastric pain        I did discuss the situation with the patient today in the office and they have done well from their recent EGD with biopsy. I have told the patient to continue PPI medication, avoid caffeine alcohol nicotine and large meals prior to bedtime.  Follow-up as needed..    Electronically signed by Ezra Iniguez MD  02/24/23                  "

## 2023-02-24 ENCOUNTER — OFFICE VISIT (OUTPATIENT)
Dept: SURGERY | Facility: CLINIC | Age: 63
End: 2023-02-24
Payer: MEDICAID

## 2023-02-24 VITALS
BODY MASS INDEX: 32.2 KG/M2 | DIASTOLIC BLOOD PRESSURE: 64 MMHG | HEART RATE: 54 BPM | OXYGEN SATURATION: 100 % | WEIGHT: 175 LBS | SYSTOLIC BLOOD PRESSURE: 128 MMHG | HEIGHT: 62 IN | RESPIRATION RATE: 18 BRPM | TEMPERATURE: 98 F

## 2023-02-24 DIAGNOSIS — K21.00 GASTROESOPHAGEAL REFLUX DISEASE WITH ESOPHAGITIS WITHOUT HEMORRHAGE: Primary | ICD-10-CM

## 2023-02-24 DIAGNOSIS — R10.13 EPIGASTRIC PAIN: ICD-10-CM

## 2023-02-24 PROCEDURE — 99212 OFFICE O/P EST SF 10 MIN: CPT | Performed by: SURGERY

## 2023-03-29 ENCOUNTER — LAB (OUTPATIENT)
Dept: LAB | Facility: HOSPITAL | Age: 63
End: 2023-03-29
Payer: MEDICAID

## 2023-03-29 ENCOUNTER — TRANSCRIBE ORDERS (OUTPATIENT)
Dept: LAB | Facility: HOSPITAL | Age: 63
End: 2023-03-29
Payer: MEDICAID

## 2023-03-29 DIAGNOSIS — E03.9 HYPOTHYROIDISM, UNSPECIFIED TYPE: ICD-10-CM

## 2023-03-29 DIAGNOSIS — E03.9 HYPOTHYROIDISM, UNSPECIFIED TYPE: Primary | ICD-10-CM

## 2023-03-29 LAB
ALBUMIN SERPL-MCNC: 4.4 G/DL (ref 3.5–5.2)
ALBUMIN/GLOB SERPL: 1.6 G/DL
ALP SERPL-CCNC: 86 U/L (ref 39–117)
ALT SERPL W P-5'-P-CCNC: 26 U/L (ref 1–33)
ANION GAP SERPL CALCULATED.3IONS-SCNC: 13.9 MMOL/L (ref 5–15)
AST SERPL-CCNC: 21 U/L (ref 1–32)
BASOPHILS # BLD AUTO: 0.07 10*3/MM3 (ref 0–0.2)
BASOPHILS NFR BLD AUTO: 1 % (ref 0–1.5)
BILIRUB SERPL-MCNC: 0.4 MG/DL (ref 0–1.2)
BUN SERPL-MCNC: 17 MG/DL (ref 8–23)
BUN/CREAT SERPL: 25.4 (ref 7–25)
CALCIUM SPEC-SCNC: 9.4 MG/DL (ref 8.6–10.5)
CHLORIDE SERPL-SCNC: 99 MMOL/L (ref 98–107)
CHOLEST SERPL-MCNC: 169 MG/DL (ref 0–200)
CO2 SERPL-SCNC: 25.1 MMOL/L (ref 22–29)
CREAT SERPL-MCNC: 0.67 MG/DL (ref 0.57–1)
DEPRECATED RDW RBC AUTO: 38.5 FL (ref 37–54)
EGFRCR SERPLBLD CKD-EPI 2021: 99.6 ML/MIN/1.73
EOSINOPHIL # BLD AUTO: 0.12 10*3/MM3 (ref 0–0.4)
EOSINOPHIL NFR BLD AUTO: 1.6 % (ref 0.3–6.2)
ERYTHROCYTE [DISTWIDTH] IN BLOOD BY AUTOMATED COUNT: 12.2 % (ref 12.3–15.4)
GLOBULIN UR ELPH-MCNC: 2.7 GM/DL
GLUCOSE SERPL-MCNC: 103 MG/DL (ref 65–99)
HCT VFR BLD AUTO: 42.7 % (ref 34–46.6)
HDLC SERPL-MCNC: 41 MG/DL (ref 40–60)
HGB BLD-MCNC: 14.7 G/DL (ref 12–15.9)
IMM GRANULOCYTES # BLD AUTO: 0.02 10*3/MM3 (ref 0–0.05)
IMM GRANULOCYTES NFR BLD AUTO: 0.3 % (ref 0–0.5)
LDLC SERPL CALC-MCNC: 108 MG/DL (ref 0–100)
LDLC/HDLC SERPL: 2.59 {RATIO}
LYMPHOCYTES # BLD AUTO: 2.02 10*3/MM3 (ref 0.7–3.1)
LYMPHOCYTES NFR BLD AUTO: 27.7 % (ref 19.6–45.3)
MCH RBC QN AUTO: 30 PG (ref 26.6–33)
MCHC RBC AUTO-ENTMCNC: 34.4 G/DL (ref 31.5–35.7)
MCV RBC AUTO: 87.1 FL (ref 79–97)
MONOCYTES # BLD AUTO: 0.7 10*3/MM3 (ref 0.1–0.9)
MONOCYTES NFR BLD AUTO: 9.6 % (ref 5–12)
NEUTROPHILS NFR BLD AUTO: 4.35 10*3/MM3 (ref 1.7–7)
NEUTROPHILS NFR BLD AUTO: 59.8 % (ref 42.7–76)
NRBC BLD AUTO-RTO: 0.1 /100 WBC (ref 0–0.2)
PLATELET # BLD AUTO: 232 10*3/MM3 (ref 140–450)
PMV BLD AUTO: 11.6 FL (ref 6–12)
POTASSIUM SERPL-SCNC: 4.4 MMOL/L (ref 3.5–5.2)
PROT SERPL-MCNC: 7.1 G/DL (ref 6–8.5)
RBC # BLD AUTO: 4.9 10*6/MM3 (ref 3.77–5.28)
SODIUM SERPL-SCNC: 138 MMOL/L (ref 136–145)
TRIGL SERPL-MCNC: 109 MG/DL (ref 0–150)
VLDLC SERPL-MCNC: 20 MG/DL (ref 5–40)
WBC NRBC COR # BLD: 7.28 10*3/MM3 (ref 3.4–10.8)

## 2023-03-29 PROCEDURE — 36415 COLL VENOUS BLD VENIPUNCTURE: CPT

## 2023-03-29 PROCEDURE — 85025 COMPLETE CBC W/AUTO DIFF WBC: CPT

## 2023-03-29 PROCEDURE — 80053 COMPREHEN METABOLIC PANEL: CPT

## 2023-03-29 PROCEDURE — 84443 ASSAY THYROID STIM HORMONE: CPT

## 2023-03-29 PROCEDURE — 80061 LIPID PANEL: CPT

## 2023-03-30 LAB — TSH SERPL DL<=0.05 MIU/L-ACNC: 4.37 UIU/ML (ref 0.27–4.2)

## 2023-06-16 ENCOUNTER — LAB (OUTPATIENT)
Dept: LAB | Facility: HOSPITAL | Age: 63
End: 2023-06-16
Payer: MEDICAID

## 2023-06-16 DIAGNOSIS — E03.9 HYPOTHYROIDISM, UNSPECIFIED TYPE: ICD-10-CM

## 2023-06-16 PROCEDURE — 36415 COLL VENOUS BLD VENIPUNCTURE: CPT

## 2023-06-16 PROCEDURE — 84443 ASSAY THYROID STIM HORMONE: CPT

## 2023-06-17 LAB — TSH SERPL DL<=0.05 MIU/L-ACNC: 1.9 UIU/ML (ref 0.27–4.2)

## 2023-10-12 ENCOUNTER — TRANSCRIBE ORDERS (OUTPATIENT)
Dept: GENERAL RADIOLOGY | Facility: HOSPITAL | Age: 63
End: 2023-10-12
Payer: MEDICAID

## 2023-10-12 ENCOUNTER — HOSPITAL ENCOUNTER (OUTPATIENT)
Dept: GENERAL RADIOLOGY | Facility: HOSPITAL | Age: 63
Discharge: HOME | End: 2023-10-12
Admitting: FAMILY MEDICINE
Payer: MEDICAID

## 2023-10-12 DIAGNOSIS — M25.431 PAIN AND SWELLING OF RIGHT WRIST: Primary | ICD-10-CM

## 2023-10-12 DIAGNOSIS — M25.531 PAIN AND SWELLING OF RIGHT WRIST: ICD-10-CM

## 2023-10-12 DIAGNOSIS — M25.431 PAIN AND SWELLING OF RIGHT WRIST: ICD-10-CM

## 2023-10-12 DIAGNOSIS — M25.531 PAIN AND SWELLING OF RIGHT WRIST: Primary | ICD-10-CM

## 2023-10-12 PROCEDURE — 73110 X-RAY EXAM OF WRIST: CPT

## 2023-12-06 ENCOUNTER — APPOINTMENT (OUTPATIENT)
Dept: GENERAL RADIOLOGY | Facility: HOSPITAL | Age: 63
End: 2023-12-06
Payer: MEDICAID

## 2023-12-06 ENCOUNTER — HOSPITAL ENCOUNTER (EMERGENCY)
Facility: HOSPITAL | Age: 63
Discharge: HOME | End: 2023-12-06
Attending: STUDENT IN AN ORGANIZED HEALTH CARE EDUCATION/TRAINING PROGRAM
Payer: MEDICAID

## 2023-12-06 VITALS
TEMPERATURE: 97.7 F | OXYGEN SATURATION: 97 % | SYSTOLIC BLOOD PRESSURE: 130 MMHG | WEIGHT: 190 LBS | HEART RATE: 83 BPM | RESPIRATION RATE: 20 BRPM | HEIGHT: 62 IN | DIASTOLIC BLOOD PRESSURE: 77 MMHG | BODY MASS INDEX: 34.96 KG/M2

## 2023-12-06 DIAGNOSIS — M54.50 LEFT-SIDED LOW BACK PAIN WITHOUT SCIATICA, UNSPECIFIED CHRONICITY: Primary | ICD-10-CM

## 2023-12-06 DIAGNOSIS — N30.00 ACUTE CYSTITIS WITHOUT HEMATURIA: ICD-10-CM

## 2023-12-06 LAB
BACTERIA UR QL AUTO: ABNORMAL /HPF
BILIRUB UR QL STRIP: NEGATIVE
CLARITY UR: CLEAR
COLOR UR: YELLOW
GLUCOSE UR STRIP-MCNC: NEGATIVE MG/DL
HGB UR QL STRIP.AUTO: NEGATIVE
HYALINE CASTS UR QL AUTO: ABNORMAL /LPF
KETONES UR QL STRIP: NEGATIVE
LEUKOCYTE ESTERASE UR QL STRIP.AUTO: ABNORMAL
NITRITE UR QL STRIP: NEGATIVE
PH UR STRIP.AUTO: 5.5 [PH] (ref 5–8)
PROT UR QL STRIP: NEGATIVE
RBC # UR STRIP: ABNORMAL /HPF
REF LAB TEST METHOD: ABNORMAL
SP GR UR STRIP: 1.01 (ref 1–1.03)
SQUAMOUS #/AREA URNS HPF: ABNORMAL /HPF
UROBILINOGEN UR QL STRIP: ABNORMAL
WBC # UR STRIP: ABNORMAL /HPF

## 2023-12-06 PROCEDURE — 72100 X-RAY EXAM L-S SPINE 2/3 VWS: CPT

## 2023-12-06 PROCEDURE — 25010000002 KETOROLAC TROMETHAMINE PER 15 MG: Performed by: NURSE PRACTITIONER

## 2023-12-06 PROCEDURE — 96372 THER/PROPH/DIAG INJ SC/IM: CPT

## 2023-12-06 PROCEDURE — 99283 EMERGENCY DEPT VISIT LOW MDM: CPT

## 2023-12-06 PROCEDURE — 81001 URINALYSIS AUTO W/SCOPE: CPT | Performed by: NURSE PRACTITIONER

## 2023-12-06 RX ORDER — CYCLOBENZAPRINE HCL 10 MG
10 TABLET ORAL ONCE
Status: COMPLETED | OUTPATIENT
Start: 2023-12-06 | End: 2023-12-06

## 2023-12-06 RX ORDER — PREDNISONE 20 MG/1
20 TABLET ORAL 2 TIMES DAILY
Qty: 10 TABLET | Refills: 0 | Status: SHIPPED | OUTPATIENT
Start: 2023-12-06 | End: 2023-12-11

## 2023-12-06 RX ORDER — KETOROLAC TROMETHAMINE 30 MG/ML
30 INJECTION, SOLUTION INTRAMUSCULAR; INTRAVENOUS ONCE
Status: COMPLETED | OUTPATIENT
Start: 2023-12-06 | End: 2023-12-06

## 2023-12-06 RX ORDER — NITROFURANTOIN 25; 75 MG/1; MG/1
100 CAPSULE ORAL 2 TIMES DAILY
Qty: 14 CAPSULE | Refills: 0 | Status: SHIPPED | OUTPATIENT
Start: 2023-12-06 | End: 2023-12-13

## 2023-12-06 RX ORDER — CYCLOBENZAPRINE HCL 10 MG
10 TABLET ORAL 2 TIMES DAILY PRN
Qty: 30 TABLET | Refills: 0 | Status: SHIPPED | OUTPATIENT
Start: 2023-12-06

## 2023-12-06 RX ADMIN — CYCLOBENZAPRINE 10 MG: 10 TABLET, FILM COATED ORAL at 11:19

## 2023-12-06 RX ADMIN — KETOROLAC TROMETHAMINE 30 MG: 30 INJECTION, SOLUTION INTRAMUSCULAR; INTRAVENOUS at 11:20

## 2023-12-06 NOTE — ED PROVIDER NOTES
Subjective  History of Present Illness:    Chief Complaint: Back pain  History of Present Illness: This is a 63-year-old female patient comes into the ED today complaining of back pain.  Patient states she has had no traumatic events but started having back pain several days ago and is progressively worsened over the interval.  Patient admits to using assistance due to the fact that she is scheduled for knee replacement soon and thinks that she is adjusting her walking gait to compensate.      Nurses Notes reviewed and agree, including vitals, allergies, social history and prior medical history.       Allergies:    Garlic      Past Surgical History:   Procedure Laterality Date    ENDOSCOPY N/A 2/10/2023    Procedure: ESOPHAGOGASTRODUODENOSCOPY with biopsy;  Surgeon: Ezra Iniguez MD;  Location: [facility] ENDOSCOPY;  Service: Gastroenterology;  Laterality: N/A;    EXPLORATORY LAPAROTOMY  2023    Patient said she had several prior to hysterectomy    HYSTERECTOMY Bilateral     Total Hysterectomy    LAPAROSCOPIC CHOLECYSTECTOMY  03/15/2022    MYOMECTOMY      OOPHORECTOMY      TOTAL KNEE ARTHROPLASTY Right 2021    Procedure: TOTAL KNEE ARTHROPLASTY RIGHT;  Surgeon: Fernando German MD;  Location: [facility] OR;  Service: Orthopedics;  Laterality: Right;         Social History     Socioeconomic History    Marital status:    Tobacco Use    Smoking status: Former     Types: Cigarettes     Quit date:      Years since quittin.9    Smokeless tobacco: Never   Vaping Use    Vaping Use: Never used   Substance and Sexual Activity    Alcohol use: No    Drug use: No    Sexual activity: Defer         Family History   Problem Relation Age of Onset    Arthritis Mother     Diabetes Mother     Hypertension Mother     Thyroid disease Mother     Hyperlipidemia Mother     Heart attack Father     Heart disease Father     Cancer Maternal Aunt     Breast cancer Neg Hx     Ovarian cancer Neg Hx        REVIEW  OF SYSTEMS: All systems reviewed and not pertinent unless noted.    Review of Systems   Musculoskeletal:  Positive for back pain and gait problem.   All other systems reviewed and are negative.      Objective    Physical Exam  Vitals and nursing note reviewed.   Constitutional:       Appearance: Normal appearance.   HENT:      Head: Normocephalic and atraumatic.   Eyes:      Extraocular Movements: Extraocular movements intact.      Pupils: Pupils are equal, round, and reactive to light.   Cardiovascular:      Rate and Rhythm: Normal rate and regular rhythm.      Pulses: Normal pulses.      Heart sounds: Normal heart sounds.   Pulmonary:      Effort: Pulmonary effort is normal.      Breath sounds: Normal breath sounds.   Abdominal:      General: Abdomen is flat. Bowel sounds are normal.      Palpations: Abdomen is soft.   Musculoskeletal:         General: Tenderness present.      Cervical back: Normal range of motion and neck supple.      Comments: Mild tenderness to palpation lumbar sacral spine lower extremity reflexes intact   Skin:     Capillary Refill: Capillary refill takes less than 2 seconds.   Neurological:      General: No focal deficit present.      Mental Status: She is alert and oriented to person, place, and time. Mental status is at baseline.      GCS: GCS eye subscore is 4. GCS verbal subscore is 5. GCS motor subscore is 6.      Sensory: Sensation is intact.      Motor: Motor function is intact.      Gait: Gait is intact.   Psychiatric:         Attention and Perception: Attention and perception normal.         Mood and Affect: Mood and affect normal.         Speech: Speech normal.         Behavior: Behavior normal. Behavior is cooperative.           Procedures    ED Course:    ED Course as of 12/06/23 1231   Wed Dec 06, 2023   1153 No obvious acute ostial abnormality.  Some decreased disc space. [KH]      ED Course User Index  [KH] Matthew Francis APRN       Lab Results (last 24 hours)        Procedure Component Value Units Date/Time    Urinalysis With Culture If Indicated - Urine, Clean Catch [[id number]]  (Abnormal) Collected: 12/06/23 1126    Specimen: Urine, Clean Catch Updated: 12/06/23 1133     Color, UA Yellow     Appearance, UA Clear     pH, UA 5.5     Specific Gravity, UA 1.011     Glucose, UA Negative     Ketones, UA Negative     Bilirubin, UA Negative     Blood, UA Negative     Protein, UA Negative     Leuk Esterase, UA Trace     Nitrite, UA Negative     Urobilinogen, UA 0.2 E.U./dL    Narrative:      In absence of clinical symptoms, the presence of pyuria, bacteria, and/or nitrites on the urinalysis result does not correlate with infection.    Urinalysis, Microscopic Only - Urine, Clean Catch [[id number]]  (Abnormal) Collected: 12/06/23 1126    Specimen: Urine, Clean Catch Updated: 12/06/23 1209     RBC, UA 0-2 /HPF      WBC, UA 3-5 /HPF      Comment: Urine culture not indicated.        Bacteria, UA Trace /HPF      Squamous Epithelial Cells, UA 3-6 /HPF      Hyaline Casts, UA None Seen /LPF      Methodology Manual Light Microscopy             XR Spine Lumbar 2 or 3 View    Result Date: 12/6/2023  PROCEDURE: XR SPINE LUMBAR 2 OR 3 VW-  HISTORY: Low back pain  COMPARISON: None.  FINDINGS: Three views of the lumbar spine were obtained. The pedicles are intact. The disk spaces are well preserved. There is no acute fracture. There is no significant subluxation. Tiny anterior osteophytes identified.      Impression: No acute process.         This report was signed and finalized on 12/6/2023 11:54 AM by Teresa Tee MD.        Medical Decision Making  This is a 63-year-old female patient comes into the ED today complaining of back pain.  Patient states she has had no traumatic events but started having back pain several days ago and is progressively worsened over the interval.  Patient admits to using assistance due to the fact that she is scheduled for knee replacement soon and thinks that she is  adjusting her walking gait to compensate.    DDX: includes but is not limited to: Lumbago, lumbago with sciatica, herniated disc, urinary tract infection other    Problems Addressed:  Acute cystitis without hematuria: complicated acute illness or injury  Left-sided low back pain without sciatica, unspecified chronicity: complicated acute illness or injury    Amount and/or Complexity of Data Reviewed  External Data Reviewed:      Details: I have personally reviewed labs, radiology EKG and notes from patient's chart  Labs: ordered. Decision-making details documented in ED Course.     Details: I have personally reviewed and documented all results  Radiology: ordered. Decision-making details documented in ED Course.     Details: I have personally reviewed and documented all results  Discussion of management or test interpretation with external provider(s): Discussed assessment, treatment and plan with ER attending    Risk  Prescription drug management.  Risk Details: I have discussed with patient the finding of the test preformed today. Patient has been diagnosed with low back pain, urinary tract infection and will be discharged home.  Patient requested to follow-up with primary care provider, orthopedics within the next 7 days for reevaluation. Strict return precautions have been given and patient verbalizes understanding                  Final diagnoses:   Left-sided low back pain without sciatica, unspecified chronicity   Acute cystitis without hematuria          Matthew Francis, STEPHAN  12/06/23 1219       Matthew Francis APRN  12/06/23 1231

## 2023-12-21 ENCOUNTER — PRE-ADMISSION TESTING (OUTPATIENT)
Dept: PREADMISSION TESTING | Facility: HOSPITAL | Age: 63
End: 2023-12-21
Payer: MEDICAID

## 2023-12-21 ENCOUNTER — HOSPITAL ENCOUNTER (OUTPATIENT)
Dept: GENERAL RADIOLOGY | Facility: HOSPITAL | Age: 63
Discharge: HOME | End: 2023-12-21
Payer: MEDICAID

## 2023-12-21 VITALS — BODY MASS INDEX: 38.99 KG/M2 | WEIGHT: 211.86 LBS | HEIGHT: 62 IN

## 2023-12-21 LAB
ANION GAP SERPL CALCULATED.3IONS-SCNC: 10 MMOL/L (ref 5–15)
BUN SERPL-MCNC: 12 MG/DL (ref 8–23)
BUN/CREAT SERPL: 19.7 (ref 7–25)
CALCIUM SPEC-SCNC: 9.6 MG/DL (ref 8.6–10.5)
CHLORIDE SERPL-SCNC: 103 MMOL/L (ref 98–107)
CO2 SERPL-SCNC: 26 MMOL/L (ref 22–29)
CREAT SERPL-MCNC: 0.61 MG/DL (ref 0.57–1)
DEPRECATED RDW RBC AUTO: 41.4 FL (ref 37–54)
EGFRCR SERPLBLD CKD-EPI 2021: 101.2 ML/MIN/1.73
ERYTHROCYTE [DISTWIDTH] IN BLOOD BY AUTOMATED COUNT: 12.5 % (ref 12.3–15.4)
GLUCOSE SERPL-MCNC: 116 MG/DL (ref 65–99)
HBA1C MFR BLD: 5.4 % (ref 4.8–5.6)
HCT VFR BLD AUTO: 45.7 % (ref 34–46.6)
HGB BLD-MCNC: 15.3 G/DL (ref 12–15.9)
MCH RBC QN AUTO: 30.1 PG (ref 26.6–33)
MCHC RBC AUTO-ENTMCNC: 33.5 G/DL (ref 31.5–35.7)
MCV RBC AUTO: 90 FL (ref 79–97)
PLATELET # BLD AUTO: 238 10*3/MM3 (ref 140–450)
PMV BLD AUTO: 11.2 FL (ref 6–12)
POTASSIUM SERPL-SCNC: 4.3 MMOL/L (ref 3.5–5.2)
RBC # BLD AUTO: 5.08 10*6/MM3 (ref 3.77–5.28)
SODIUM SERPL-SCNC: 139 MMOL/L (ref 136–145)
WBC NRBC COR # BLD AUTO: 7.19 10*3/MM3 (ref 3.4–10.8)

## 2023-12-21 PROCEDURE — 36415 COLL VENOUS BLD VENIPUNCTURE: CPT

## 2023-12-21 PROCEDURE — 83036 HEMOGLOBIN GLYCOSYLATED A1C: CPT

## 2023-12-21 PROCEDURE — 85027 COMPLETE CBC AUTOMATED: CPT

## 2023-12-21 PROCEDURE — 93005 ELECTROCARDIOGRAM TRACING: CPT

## 2023-12-21 PROCEDURE — 80048 BASIC METABOLIC PNL TOTAL CA: CPT

## 2023-12-21 PROCEDURE — 71046 X-RAY EXAM CHEST 2 VIEWS: CPT

## 2023-12-21 RX ORDER — LEVOTHYROXINE SODIUM 0.07 MG/1
75 TABLET ORAL
COMMUNITY

## 2023-12-21 NOTE — PAT
An arrival time for procedure was not provided during PAT visit. If patient had any questions or concerns about their arrival time, they were instructed to contact their surgeon/physician.  Additionally, if the patient referred to an arrival time that was acquired from their my chart account, patient was encouraged to verify that time with their surgeon/physician. Arrival times are NOT provided in Pre Admission Testing Department.    Patient viewed general PAT education video as instructed in their preoperative information received from their surgeon.  Patient stated the general PAT education video was viewed in its entirety and survey completed.  Copies of PAT general education handouts (Incentive Spirometry, Meds to Beds Program, Patient Belongings, Pre-op skin preparation instructions, Blood Glucose testing, Visitor policy, Surgery FAQ, Code H) distributed to patient if not printed. Education related to the PAT pass and skin preparation for surgery (if applicable) completed in PAT as a reinforcement to PAT education video. Patient instructed to return PAT pass provided today as well as completed skin preparation sheet (if applicable) on the day of procedure.     Additionally if patient had not viewed video yet but intended to view it at home or in our waiting area, then referred them to the handout with QR code/link provided during PAT visit.  Instructed patient to complete survey after viewing the video in its entirety.  Encouraged patient/family to read PAT general education handouts thoroughly and notify PAT staff with any questions or concerns. Patient verbalized understanding of all information and priority content.    Patient denies any current skin issues.     Patient to apply Chlorhexadine wipes  to surgical area (as instructed) the night before procedure and the AM of procedure. Wipes provided.    Patient instructed to drink 20 ounces of Gatorade or Gatorlyte (if diabetic) and it needs to be completed 1  hour (for Main OR patients) or 2 hours (scheduled  section & [facility] patients) before given arrival time for procedure (NO RED Gatorade and NO Gatorade Zero).    Patient verbalized understanding.    It was noted during Pre Admission Testing that patient was wearing some form of fingernail polish (gel/regular) and/or acrylic/artificial nails.  Patient was told that polish and/or artificial nails must be removed for surgery.  If a patient had recent manicure, and would rather not remove polish or artificial nails. Then the minimum requirement is that the polish/artificial nails must be removed from the middle finger on each hand.  Patient verbalized understanding.    If patient was having surgery on an upper extremity, then the patient was instructed that fingernail polish/artificial fingernails must be removed for surgery.  NO EXCEPTIONS.  Patient verbalized understanding.    If patient was having surgery on a lower extremity, then the patient was instructed that toenail polish on both extremities must be removed for surgery.  NO EXCEPTIONS. Patient verbalized understanding.    Post-Surgery Information Instruction Sheet given to patient during Pre-Admission Testing Visit with verbal instructions to patient to return with PAT PASS on the day of surgery. Additionally, encouraged patient to review the information provided.    [employer] (by [employer]) pain pump patient informational handout given to patient.  Instructed patient to watch [employer] Patient Education Video regarding Peripheral Nerve Catheter that will be in place for upcoming surgery unless contraindicated. The video can be accessed using QR code noted on handout.  Patient agreed to watch video.  Stressed to patient to call [employer] Nursing Hotline  if patient has any questions or concerns after discharge.     Discussed with patient options for receiving total joint replacement education and assessed patient's ability and preference. Joint  Replacement Guide given to patient during PAT visit since not received a copy within the last year. Encouraged patient/family to read guide thoroughly and notify PAT staff with any questions or concerns. Handout provided directing patient to links to watch online videos related to joint replacement surgery on the [facility] website. The handout gives detailed instructions for joining an online joint replacement class through Zoom or phone conference offered on Thursdays. Patient agreed to participate by joining online class through Zoom. Patient verbalized understanding of instructions and to complete the online learning tool survey. Encouraged to share information with family and/or . An overview of the joint replacement education was provided during the visit including general perioperative instructions that are routine for all surgical patients (PAT PASS, wipes, directions to pre-op, etc.).  PT STATED SHE ALREADY COMPLETED ZOOM CLASS.     EKG from PAT today faxed to anesthesiology department for review and cardiac clearance. RN spoke with  DR. SHRESTHA and reviewed pertinent medical history and EKG results.  Per DR. SHRESTHA, patient is cleared to proceed with procedure as planned without additional cardiac testing. Patient denies chest pain or increased shortness of breath.

## 2023-12-22 LAB
QT INTERVAL: 384 MS
QTC INTERVAL: 411 MS

## 2024-02-05 ENCOUNTER — LAB (OUTPATIENT)
Dept: LAB | Facility: HOSPITAL | Age: 64
End: 2024-02-05
Payer: MEDICAID

## 2024-02-05 ENCOUNTER — TRANSCRIBE ORDERS (OUTPATIENT)
Dept: LAB | Facility: HOSPITAL | Age: 64
End: 2024-02-05
Payer: MEDICAID

## 2024-02-05 DIAGNOSIS — M25.531 RIGHT WRIST PAIN: ICD-10-CM

## 2024-02-05 DIAGNOSIS — M25.531 RIGHT WRIST PAIN: Primary | ICD-10-CM

## 2024-02-05 LAB
BASOPHILS # BLD AUTO: 0.07 10*3/MM3 (ref 0–0.2)
BASOPHILS NFR BLD AUTO: 1 % (ref 0–1.5)
CHROMATIN AB SERPL-ACNC: <10 IU/ML (ref 0–14)
CRP SERPL-MCNC: 0.37 MG/DL (ref 0–0.5)
DEPRECATED RDW RBC AUTO: 41 FL (ref 37–54)
EOSINOPHIL # BLD AUTO: 0.07 10*3/MM3 (ref 0–0.4)
EOSINOPHIL NFR BLD AUTO: 1 % (ref 0.3–6.2)
ERYTHROCYTE [DISTWIDTH] IN BLOOD BY AUTOMATED COUNT: 12.6 % (ref 12.3–15.4)
ERYTHROCYTE [SEDIMENTATION RATE] IN BLOOD: 9 MM/HR (ref 0–30)
HCT VFR BLD AUTO: 44 % (ref 34–46.6)
HGB BLD-MCNC: 15 G/DL (ref 12–15.9)
IMM GRANULOCYTES # BLD AUTO: 0.02 10*3/MM3 (ref 0–0.05)
IMM GRANULOCYTES NFR BLD AUTO: 0.3 % (ref 0–0.5)
LYMPHOCYTES # BLD AUTO: 1.87 10*3/MM3 (ref 0.7–3.1)
LYMPHOCYTES NFR BLD AUTO: 26.9 % (ref 19.6–45.3)
MCH RBC QN AUTO: 30.4 PG (ref 26.6–33)
MCHC RBC AUTO-ENTMCNC: 34.1 G/DL (ref 31.5–35.7)
MCV RBC AUTO: 89.1 FL (ref 79–97)
MONOCYTES # BLD AUTO: 0.57 10*3/MM3 (ref 0.1–0.9)
MONOCYTES NFR BLD AUTO: 8.2 % (ref 5–12)
NEUTROPHILS NFR BLD AUTO: 4.36 10*3/MM3 (ref 1.7–7)
NEUTROPHILS NFR BLD AUTO: 62.6 % (ref 42.7–76)
NRBC BLD AUTO-RTO: 0 /100 WBC (ref 0–0.2)
PLATELET # BLD AUTO: 241 10*3/MM3 (ref 140–450)
PMV BLD AUTO: 11.9 FL (ref 6–12)
RBC # BLD AUTO: 4.94 10*6/MM3 (ref 3.77–5.28)
URATE SERPL-MCNC: 3.4 MG/DL (ref 2.4–5.7)
WBC NRBC COR # BLD AUTO: 6.96 10*3/MM3 (ref 3.4–10.8)

## 2024-02-05 PROCEDURE — 85652 RBC SED RATE AUTOMATED: CPT

## 2024-02-05 PROCEDURE — 86431 RHEUMATOID FACTOR QUANT: CPT

## 2024-02-05 PROCEDURE — 86038 ANTINUCLEAR ANTIBODIES: CPT

## 2024-02-05 PROCEDURE — 86200 CCP ANTIBODY: CPT

## 2024-02-05 PROCEDURE — 36415 COLL VENOUS BLD VENIPUNCTURE: CPT

## 2024-02-05 PROCEDURE — 81374 HLA I TYPING 1 ANTIGEN LR: CPT

## 2024-02-05 PROCEDURE — 84550 ASSAY OF BLOOD/URIC ACID: CPT

## 2024-02-05 PROCEDURE — 87476 LYME DIS DNA AMP PROBE: CPT

## 2024-02-05 PROCEDURE — 85025 COMPLETE CBC W/AUTO DIFF WBC: CPT

## 2024-02-05 PROCEDURE — 86140 C-REACTIVE PROTEIN: CPT

## 2024-02-06 LAB
ANA SER QL: NEGATIVE
CCP IGA+IGG SERPL IA-ACNC: 5 UNITS (ref 0–19)

## 2024-02-07 LAB — B BURGDOR DNA SPEC QL NAA+PROBE: NEGATIVE

## 2024-02-12 LAB — HLA-B27 QL NAA+PROBE: NEGATIVE

## 2024-04-11 ENCOUNTER — OFFICE VISIT (OUTPATIENT)
Dept: INTERNAL MEDICINE | Facility: CLINIC | Age: 64
End: 2024-04-11
Payer: MEDICAID

## 2024-04-11 VITALS
OXYGEN SATURATION: 95 % | SYSTOLIC BLOOD PRESSURE: 142 MMHG | WEIGHT: 218 LBS | HEART RATE: 75 BPM | BODY MASS INDEX: 40.12 KG/M2 | RESPIRATION RATE: 16 BRPM | DIASTOLIC BLOOD PRESSURE: 80 MMHG | HEIGHT: 62 IN

## 2024-04-11 DIAGNOSIS — Z12.11 COLON CANCER SCREENING: ICD-10-CM

## 2024-04-11 DIAGNOSIS — E03.9 HYPOTHYROIDISM, UNSPECIFIED TYPE: Primary | ICD-10-CM

## 2024-04-11 DIAGNOSIS — K82.8 BILIARY DYSKINESIA: ICD-10-CM

## 2024-04-11 DIAGNOSIS — K21.00 GASTROESOPHAGEAL REFLUX DISEASE WITH ESOPHAGITIS WITHOUT HEMORRHAGE: ICD-10-CM

## 2024-04-11 DIAGNOSIS — E66.09 CLASS 2 OBESITY DUE TO EXCESS CALORIES WITHOUT SERIOUS COMORBIDITY WITH BODY MASS INDEX (BMI) OF 39.0 TO 39.9 IN ADULT: ICD-10-CM

## 2024-04-11 DIAGNOSIS — R19.5 POSITIVE COLORECTAL CANCER SCREENING USING COLOGUARD TEST: ICD-10-CM

## 2024-04-11 DIAGNOSIS — F41.9 ANXIETY: ICD-10-CM

## 2024-04-11 DIAGNOSIS — R73.03 PREDIABETES: ICD-10-CM

## 2024-04-11 DIAGNOSIS — Z11.59 ENCOUNTER FOR HEPATITIS C SCREENING TEST FOR LOW RISK PATIENT: ICD-10-CM

## 2024-04-11 DIAGNOSIS — M17.0 OSTEOARTHRITIS OF BOTH KNEES, UNSPECIFIED OSTEOARTHRITIS TYPE: ICD-10-CM

## 2024-04-11 PROBLEM — R53.83 OTHER FATIGUE: Status: RESOLVED | Noted: 2021-02-17 | Resolved: 2024-04-11

## 2024-04-11 PROBLEM — M17.11 PRIMARY LOCALIZED OSTEOARTHRITIS OF RIGHT KNEE: Status: RESOLVED | Noted: 2021-05-13 | Resolved: 2024-04-11

## 2024-04-11 PROBLEM — R11.14 BILIOUS VOMITING WITH NAUSEA: Status: RESOLVED | Noted: 2023-02-02 | Resolved: 2024-04-11

## 2024-04-11 PROBLEM — Z91.89 HIGH RISK FOR DIABETES MELLITUS: Status: RESOLVED | Noted: 2017-01-06 | Resolved: 2024-04-11

## 2024-04-11 PROBLEM — L72.3 SEBACEOUS CYST: Status: RESOLVED | Noted: 2019-12-12 | Resolved: 2024-04-11

## 2024-04-11 PROBLEM — E66.812 CLASS 2 OBESITY DUE TO EXCESS CALORIES WITHOUT SERIOUS COMORBIDITY WITH BODY MASS INDEX (BMI) OF 39.0 TO 39.9 IN ADULT: Status: ACTIVE | Noted: 2021-05-13

## 2024-04-11 PROBLEM — Z87.891 FORMER TOBACCO USE: Status: RESOLVED | Noted: 2021-02-17 | Resolved: 2024-04-11

## 2024-04-11 PROBLEM — S86.892A: Status: RESOLVED | Noted: 2021-09-15 | Resolved: 2024-04-11

## 2024-04-11 RX ORDER — FAMOTIDINE 20 MG/1
20 TABLET, FILM COATED ORAL 2 TIMES DAILY
Qty: 90 TABLET | Refills: 1 | Status: SHIPPED | OUTPATIENT
Start: 2024-04-11

## 2024-04-11 NOTE — PROGRESS NOTES
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 female who presents today for chronic conditions    Hypothyroidism, hx of goiter: on levothyroxine, notes occasional palpitations. 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   Prediabetes: not on any medications, last A1c was 5.4  Bilateral knee osteoarthritis: follows with ortho    Pap smear s/p hysterectomy  Mammogram 2023 birads 2  Colonoscopy (+) cologuard   Previous smoker quit 30 years ago    Past Medical History:   Past Medical History:   Diagnosis Date    Anxiety     Arthritis     Endometriosis     History of panic attacks     Hypothyroid     Palpitations     anxiety induced    PONV (postoperative nausea and vomiting)     Uterine leiomyoma        Past Surgical History:   Past Surgical History:   Procedure Laterality Date    ENDOSCOPY N/A 02/10/2023    Procedure: ESOPHAGOGASTRODUODENOSCOPY with biopsy;  Surgeon: Ezra Iniguez MD;  Location: [facility] ENDOSCOPY;  Service: Gastroenterology;  Laterality: N/A;    EXPLORATORY LAPAROTOMY  2023    Patient said she had several prior to hysterectomy    HYSTERECTOMY Bilateral     Total Hysterectomy    LAPAROSCOPIC CHOLECYSTECTOMY  03/15/2022    MYOMECTOMY      OOPHORECTOMY      TOTAL KNEE ARTHROPLASTY Right 2021    Procedure: TOTAL KNEE ARTHROPLASTY RIGHT;  Surgeon: Fernando German MD;  Location: [facility] OR;  Service: Orthopedics;  Laterality: Right;       Immunizations:   There is no immunization history on file for this patient.     Medications:     Current Outpatient Medications:     ibuprofen (ADVIL,MOTRIN) 200 MG tablet, Take 1 tablet by mouth Every 6 (Six) Hours As Needed  "for Mild Pain., Disp: , Rfl:     levothyroxine (SYNTHROID, LEVOTHROID) 75 MCG tablet, Take 1 tablet by mouth Every Morning., Disp: , Rfl:     famotidine (Pepcid) 20 MG tablet, Take 1 tablet by mouth 2 (Two) Times a Day., Disp: 90 tablet, Rfl: 1    Allergies:   Allergies   Allergen Reactions    Garlic GI Intolerance       Family History:   Family History   Problem Relation Age of Onset    Arthritis Mother     Diabetes Mother     Hypertension Mother     Thyroid disease Mother     Hyperlipidemia Mother     Heart attack Father     Heart disease Father     Cancer Maternal Aunt     Breast cancer Neg Hx     Ovarian cancer Neg Hx        Social History:   Social History     Socioeconomic History    Marital status:    Tobacco Use    Smoking status: Former     Current packs/day: 0.00     Types: Cigarettes     Quit date:      Years since quittin.2    Smokeless tobacco: Never   Vaping Use    Vaping status: Never Used   Substance and Sexual Activity    Alcohol use: No    Drug use: No    Sexual activity: Not Currently     Partners: Male       Health Maintenance   Topic Date Due    HEPATITIS C SCREENING  Never done    COLORECTAL CANCER SCREENING  2021    ANNUAL PHYSICAL  2022    PAP SMEAR  2024    COVID-19 Vaccine ( - -24 season) 03/10/2025 (Originally 2023)    RSV Vaccine - Adults (1 - 1-dose 60+ series) 2025 (Originally 2021)    TDAP/TD VACCINES (1 - Tdap) 2025 (Originally 1980)    ZOSTER VACCINE (1 of 2) 2025 (Originally 2011)    INFLUENZA VACCINE  2024    BMI FOLLOWUP  2025    MAMMOGRAM  2025    Pneumococcal Vaccine 0-64  Aged Out       Objective     Vital Signs  /80   Pulse 75   Resp 16   Ht 157.5 cm (62.01\")   Wt 98.9 kg (218 lb)   SpO2 95%   BMI 39.86 kg/m²   Estimated body mass index is 39.86 kg/m² as calculated from the following:    Height as of this encounter: 157.5 cm (62.01\").    Weight as of this encounter: 98.9 kg " (218 lb).    Class 2 Severe Obesity (BMI >=35 and <=39.9). Obesity-related health conditions include the following: none. Obesity is unchanged. BMI is is above average; BMI management plan is completed. We discussed portion control and increasing exercise.      Physical Exam  Vitals and nursing note reviewed.   Constitutional:       Appearance: Normal appearance.   HENT:      Head: Normocephalic and atraumatic.   Cardiovascular:      Rate and Rhythm: Normal rate and regular rhythm.      Pulses: Normal pulses.      Heart sounds: Normal heart sounds.   Pulmonary:      Effort: Pulmonary effort is normal. No respiratory distress.      Breath sounds: Normal breath sounds. No wheezing, rhonchi or rales.   Abdominal:      General: Abdomen is flat. Bowel sounds are normal.      Palpations: Abdomen is soft.      Tenderness: There is no abdominal tenderness. There is no guarding.   Musculoskeletal:      Cervical back: Neck supple.   Skin:     General: Skin is warm.      Capillary Refill: Capillary refill takes less than 2 seconds.   Neurological:      General: No focal deficit present.      Mental Status: She is alert. Mental status is at baseline.   Psychiatric:         Mood and Affect: Mood normal.         Behavior: Behavior normal.          Procedures     Assessment and Plan     Diagnoses and all orders for this visit:    1. Hypothyroidism, unspecified type (Primary)  Assessment & Plan:  Stable on current medication and dosage. Will continue current management. Refill medication as necessary.  Levothyroxine  Have labs done    Orders:  -     TSH Rfx On Abnormal To Free T4    2. Class 2 obesity due to excess calories without serious comorbidity with body mass index (BMI) of 39.0 to 39.9 in adult  Assessment & Plan:  Patient's (Body mass index is 39.86 kg/m².) indicates that they are morbidly/severely obese (BMI > 40 or > 35 with obesity - related health condition) with health conditions that include none . Weight is  unchanged. BMI  is above average; BMI management plan is completed. We discussed portion control and increasing exercise.     Orders:  -     Cancel: CBC & Differential  -     Comprehensive Metabolic Panel  -     Cancel: Hemoglobin A1c  -     Lipid Panel    3. Prediabetes  Assessment & Plan:  Diet controlled, monitor a1c    Orders:  -     Cancel: Hemoglobin A1c    4. Biliary dyskinesia  Assessment & Plan:  Stable at this time      5. Anxiety  Assessment & Plan:  Refused pharmacotherapy  Advised CBT, refused as well      6. Osteoarthritis of both knees, unspecified osteoarthritis type  Assessment & Plan:  Follows with ortho, about to have L knee surgery      7. Encounter for hepatitis C screening test for low risk patient  -     Hepatitis C RNA, Quantitative, PCR (graph)    8. Gastroesophageal reflux disease with esophagitis without hemorrhage  Assessment & Plan:  Omeprazole 40 not helping  Start famotidine    Orders:  -     famotidine (Pepcid) 20 MG tablet; Take 1 tablet by mouth 2 (Two) Times a Day.  Dispense: 90 tablet; Refill: 1    9. Colon cancer screening  -     Ambulatory Referral For Screening Colonoscopy    10. Positive colorectal cancer screening using Cologuard test  -     Ambulatory Referral For Screening Colonoscopy         Counseling was given to patient for the following topics: instructions for management, risk factor reductions, risks and benefits of treatment options, and importance of treatment compliance.    Follow Up  Return in about 6 months (around 10/11/2024) for Annual.    MD SHELBY Rhoades [facility]  [address] [place] [id number] [phone]

## 2024-04-11 NOTE — ASSESSMENT & PLAN NOTE
Stable on current medication and dosage. Will continue current management. Refill medication as necessary.  Levothyroxine  Have labs done

## 2024-04-11 NOTE — ASSESSMENT & PLAN NOTE
Patient's (Body mass index is 39.86 kg/m².) indicates that they are morbidly/severely obese (BMI > 40 or > 35 with obesity - related health condition) with health conditions that include none . Weight is unchanged. BMI  is above average; BMI management plan is completed. We discussed portion control and increasing exercise.

## 2024-04-11 NOTE — ASSESSMENT & PLAN NOTE
Assessment:     1. Thumb laceration, left, initial encounter  Dermabond Stitching Gel          Plan:     Left thumb laceration 1.5 cm across, there was no involvement with the joint or tendons.  The area claimed with water and wiped with alcohol wipe.  Dermabond applied.  Advised patient to keep the area clean and dry.  He may stay off from work today and tomorrow.  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  Monitor for signs and symptoms of infection.  May return to clinic if he noticed the area is swollen, erythema, warmth to touch, and greenish discharge.  Patient verbalized understanding the plan of care.    Last tdap 2011, this was a clean cut therefore tetanus shot was deferred    Subjective:       20 y.o. male presents for evaluation of the laceration on his left thumb that has been about 40 minutes ago.  This happened at work when he was using a  to open up a box.  Patient reports that initially he was bleeding but now the bleeding has ceased.  He reports some numbness, minimal pain, he has not taken any medications for this condition.    The following portions of the patient's history were reviewed and updated as appropriate: allergies, current medications, past family history, past medical history, past social history, past surgical history and problem list.    Review of Systems  A 12 point comprehensive review of systems was negative except as noted.     Objective:      /82 (Patient Site: Right Arm, Patient Position: Sitting, Cuff Size: Adult Regular)  Pulse 73  Temp 98.2  F (36.8  C) (Oral)   Resp 18  Wt 185 lb 9.6 oz (84.2 kg)  SpO2 99%  BMI 26.25 kg/m2  General appearance: alert, appears stated age, cooperative and no distress  Head: Normocephalic, without obvious abnormality, atraumatic  Lungs: clear to auscultation bilaterally  Heart: regular rate and rhythm, S1, S2 normal, no murmur, click, rub or gallop  Extremities: extremities normal,  Follows with ortho, about to have L knee surgery   atraumatic, no cyanosis or edema  Skin: Laceration 1.5 cm at an angle on the dorsal of the left thumb, minimal bleeding, straight ages, clean.  Neurologic: Grossly normal     This note has been dictated using voice recognition software. Any grammatical or context distortions are unintentional and inherent to the software

## 2024-04-12 ENCOUNTER — TELEPHONE (OUTPATIENT)
Dept: SURGERY | Facility: CLINIC | Age: 64
End: 2024-04-12
Payer: MEDICAID

## 2024-04-19 ENCOUNTER — TELEPHONE (OUTPATIENT)
Dept: INTERNAL MEDICINE | Facility: CLINIC | Age: 64
End: 2024-04-19
Payer: MEDICAID

## 2024-04-19 NOTE — TELEPHONE ENCOUNTER
Caller: Monique Tierney    Relationship: Self    Best call back number: [phone]     Caller requesting test results: PATIENT    What test was performed: LABS    When was the test performed: 4/18/24    Where was the test performed: DOWNSTAIRS    Additional notes: PHONE CALL PLEASE

## 2024-04-19 NOTE — TELEPHONE ENCOUNTER
Patient notified that some results are Pending and Dr. Toro is not here today. She states understanding.

## 2024-04-20 DIAGNOSIS — F41.9 ANXIETY: ICD-10-CM

## 2024-04-20 DIAGNOSIS — E03.9 HYPOTHYROIDISM, UNSPECIFIED TYPE: Primary | ICD-10-CM

## 2024-04-20 DIAGNOSIS — K21.00 GASTROESOPHAGEAL REFLUX DISEASE WITH ESOPHAGITIS WITHOUT HEMORRHAGE: ICD-10-CM

## 2024-04-20 DIAGNOSIS — E66.09 CLASS 2 OBESITY DUE TO EXCESS CALORIES WITHOUT SERIOUS COMORBIDITY WITH BODY MASS INDEX (BMI) OF 39.0 TO 39.9 IN ADULT: ICD-10-CM

## 2024-04-20 DIAGNOSIS — R73.03 PREDIABETES: ICD-10-CM

## 2024-04-20 LAB
ALBUMIN SERPL-MCNC: 4.4 G/DL (ref 3.9–4.9)
ALBUMIN/GLOB SERPL: 1.8 {RATIO} (ref 1.2–2.2)
ALP SERPL-CCNC: 94 IU/L (ref 44–121)
ALT SERPL-CCNC: 27 IU/L (ref 0–32)
AST SERPL-CCNC: 24 IU/L (ref 0–40)
BILIRUB SERPL-MCNC: 0.5 MG/DL (ref 0–1.2)
BUN SERPL-MCNC: 18 MG/DL (ref 8–27)
BUN/CREAT SERPL: 28 (ref 12–28)
CALCIUM SERPL-MCNC: 9.3 MG/DL (ref 8.7–10.3)
CHLORIDE SERPL-SCNC: 104 MMOL/L (ref 96–106)
CHOLEST SERPL-MCNC: 177 MG/DL (ref 100–199)
CO2 SERPL-SCNC: 21 MMOL/L (ref 20–29)
CREAT SERPL-MCNC: 0.65 MG/DL (ref 0.57–1)
EGFRCR SERPLBLD CKD-EPI 2021: 99 ML/MIN/1.73
GLOBULIN SER CALC-MCNC: 2.5 G/DL (ref 1.5–4.5)
GLUCOSE SERPL-MCNC: 108 MG/DL (ref 70–99)
HCV RNA SERPL NAA+PROBE-ACNC: NORMAL IU/ML
HDLC SERPL-MCNC: 42 MG/DL
LDLC SERPL CALC-MCNC: 115 MG/DL (ref 0–99)
POTASSIUM SERPL-SCNC: 4.4 MMOL/L (ref 3.5–5.2)
PROT SERPL-MCNC: 6.9 G/DL (ref 6–8.5)
SODIUM SERPL-SCNC: 139 MMOL/L (ref 134–144)
TEST INFORMATION: NORMAL
TRIGL SERPL-MCNC: 111 MG/DL (ref 0–149)
TSH SERPL DL<=0.005 MIU/L-ACNC: 3.07 UIU/ML (ref 0.45–4.5)
VLDLC SERPL CALC-MCNC: 20 MG/DL (ref 5–40)

## 2024-04-26 ENCOUNTER — PRE-ADMISSION TESTING (OUTPATIENT)
Dept: PREADMISSION TESTING | Facility: HOSPITAL | Age: 64
End: 2024-04-26
Payer: MEDICAID

## 2024-04-26 VITALS — HEIGHT: 62 IN | WEIGHT: 214.51 LBS | BODY MASS INDEX: 39.47 KG/M2

## 2024-04-26 LAB
ANION GAP SERPL CALCULATED.3IONS-SCNC: 11 MMOL/L (ref 5–15)
BUN SERPL-MCNC: 14 MG/DL (ref 8–23)
BUN/CREAT SERPL: 23.7 (ref 7–25)
CALCIUM SPEC-SCNC: 9.3 MG/DL (ref 8.6–10.5)
CHLORIDE SERPL-SCNC: 103 MMOL/L (ref 98–107)
CO2 SERPL-SCNC: 26 MMOL/L (ref 22–29)
CREAT SERPL-MCNC: 0.59 MG/DL (ref 0.57–1)
DEPRECATED RDW RBC AUTO: 41 FL (ref 37–54)
EGFRCR SERPLBLD CKD-EPI 2021: 102 ML/MIN/1.73
ERYTHROCYTE [DISTWIDTH] IN BLOOD BY AUTOMATED COUNT: 12.4 % (ref 12.3–15.4)
GLUCOSE SERPL-MCNC: 96 MG/DL (ref 65–99)
HBA1C MFR BLD: 5.5 % (ref 4.8–5.6)
HCT VFR BLD AUTO: 45.6 % (ref 34–46.6)
HGB BLD-MCNC: 15.1 G/DL (ref 12–15.9)
MCH RBC QN AUTO: 29.5 PG (ref 26.6–33)
MCHC RBC AUTO-ENTMCNC: 33.1 G/DL (ref 31.5–35.7)
MCV RBC AUTO: 89.2 FL (ref 79–97)
PLATELET # BLD AUTO: 220 10*3/MM3 (ref 140–450)
PMV BLD AUTO: 11.4 FL (ref 6–12)
POTASSIUM SERPL-SCNC: 4.2 MMOL/L (ref 3.5–5.2)
RBC # BLD AUTO: 5.11 10*6/MM3 (ref 3.77–5.28)
SODIUM SERPL-SCNC: 140 MMOL/L (ref 136–145)
WBC NRBC COR # BLD AUTO: 7.5 10*3/MM3 (ref 3.4–10.8)

## 2024-04-26 PROCEDURE — 36415 COLL VENOUS BLD VENIPUNCTURE: CPT

## 2024-04-26 PROCEDURE — 83036 HEMOGLOBIN GLYCOSYLATED A1C: CPT

## 2024-04-26 PROCEDURE — 80048 BASIC METABOLIC PNL TOTAL CA: CPT

## 2024-04-26 PROCEDURE — 85027 COMPLETE CBC AUTOMATED: CPT

## 2024-04-26 RX ORDER — OMEPRAZOLE 40 MG/1
40 CAPSULE, DELAYED RELEASE ORAL DAILY
COMMUNITY

## 2024-04-26 NOTE — PAT
Patient to apply Chlorhexadine wipes  to surgical area (as instructed) the night before procedure and the AM of procedure. Wipes provided.    Patient instructed to drink 20 ounces of Gatorade or Gatorlyte (if diabetic) and it needs to be completed 1 hour (for Main OR patients) or 2 hours (scheduled  section & [facility] patients) before given arrival time for procedure (NO RED Gatorade and NO Gatorade Zero).    Patient verbalized understanding.    Discussed with patient options for receiving total joint replacement education and assessed patient's ability and preference. Joint Replacement Guide given to patient during PAT visit since not received a copy within the last year. Encouraged patient/family to read guide thoroughly and notify PAT staff with any questions or concerns. Handout provided directing patient to links to watch online videos related to joint replacement surgery on the [facility] website. The handout gives detailed instructions for joining an online joint replacement class through Zoom or phone conference offered on . Patient agreed to participate by watching videos online. Patient verbalized understanding of instructions and to complete the online learning tool survey. Encouraged to share information with family and/or . An overview of the joint replacement education was provided during the visit including general perioperative instructions that are routine for all surgical patients (PAT PASS, wipes, directions to pre-op, etc.).    EKG FROM  REVIEWED WITH DR DAVIAN SAMUELS DURING PAT, OK TO PROCEED WITH SURGERY FROM ANESTHESIA

## 2024-05-08 ENCOUNTER — ANESTHESIA EVENT (OUTPATIENT)
Dept: PERIOP | Facility: HOSPITAL | Age: 64
End: 2024-05-08
Payer: MEDICAID

## 2024-05-08 RX ORDER — FAMOTIDINE 10 MG/ML
20 INJECTION, SOLUTION INTRAVENOUS ONCE
Status: CANCELLED | OUTPATIENT
Start: 2024-05-08 | End: 2024-05-08

## 2024-05-08 RX ORDER — SODIUM CHLORIDE 0.9 % (FLUSH) 0.9 %
10 SYRINGE (ML) INJECTION AS NEEDED
Status: CANCELLED | OUTPATIENT
Start: 2024-05-08

## 2024-05-08 RX ORDER — SODIUM CHLORIDE 9 MG/ML
40 INJECTION, SOLUTION INTRAVENOUS AS NEEDED
Status: CANCELLED | OUTPATIENT
Start: 2024-05-08

## 2024-05-08 RX ORDER — SODIUM CHLORIDE 0.9 % (FLUSH) 0.9 %
10 SYRINGE (ML) INJECTION EVERY 12 HOURS SCHEDULED
Status: CANCELLED | OUTPATIENT
Start: 2024-05-08

## 2024-05-09 ENCOUNTER — ANESTHESIA EVENT CONVERTED (OUTPATIENT)
Dept: ANESTHESIOLOGY | Facility: HOSPITAL | Age: 64
End: 2024-05-09
Payer: MEDICAID

## 2024-05-09 ENCOUNTER — HOSPITAL ENCOUNTER (OUTPATIENT)
Facility: HOSPITAL | Age: 64
Discharge: HOME | End: 2024-05-09
Attending: ORTHOPAEDIC SURGERY | Admitting: ORTHOPAEDIC SURGERY
Payer: MEDICAID

## 2024-05-09 ENCOUNTER — ANESTHESIA (OUTPATIENT)
Dept: PERIOP | Facility: HOSPITAL | Age: 64
End: 2024-05-09
Payer: MEDICAID

## 2024-05-09 ENCOUNTER — APPOINTMENT (OUTPATIENT)
Dept: GENERAL RADIOLOGY | Facility: HOSPITAL | Age: 64
End: 2024-05-09
Payer: MEDICAID

## 2024-05-09 VITALS
OXYGEN SATURATION: 97 % | HEART RATE: 60 BPM | RESPIRATION RATE: 18 BRPM | SYSTOLIC BLOOD PRESSURE: 114 MMHG | DIASTOLIC BLOOD PRESSURE: 51 MMHG | TEMPERATURE: 97.5 F

## 2024-05-09 DIAGNOSIS — Z96.652 STATUS POST LEFT KNEE REPLACEMENT: Primary | ICD-10-CM

## 2024-05-09 PROBLEM — M17.12 PRIMARY LOCALIZED OSTEOARTHRITIS OF LEFT KNEE: Status: ACTIVE | Noted: 2024-05-09

## 2024-05-09 PROCEDURE — 25010000002 ROPIVACAINE PER 1 MG: Performed by: ORTHOPAEDIC SURGERY

## 2024-05-09 PROCEDURE — 25010000002 CEFAZOLIN PER 500 MG: Performed by: ORTHOPAEDIC SURGERY

## 2024-05-09 PROCEDURE — 25010000002 FENTANYL CITRATE (PF) 50 MCG/ML SOLUTION

## 2024-05-09 PROCEDURE — 25010000002 BUPIVACAINE (PF) 0.25 % SOLUTION: Performed by: NURSE ANESTHETIST, CERTIFIED REGISTERED

## 2024-05-09 PROCEDURE — S0260 H&P FOR SURGERY: HCPCS | Performed by: PHYSICIAN ASSISTANT

## 2024-05-09 PROCEDURE — C1713 ANCHOR/SCREW BN/BN,TIS/BN: HCPCS | Performed by: ORTHOPAEDIC SURGERY

## 2024-05-09 PROCEDURE — 63710000001 ONDANSETRON ODT 4 MG TABLET DISPERSIBLE: Performed by: ORTHOPAEDIC SURGERY

## 2024-05-09 PROCEDURE — 97162 PT EVAL MOD COMPLEX 30 MIN: CPT

## 2024-05-09 PROCEDURE — 25010000002 PROPOFOL 10 MG/ML EMULSION: Performed by: NURSE ANESTHETIST, CERTIFIED REGISTERED

## 2024-05-09 PROCEDURE — C1755 CATHETER, INTRASPINAL: HCPCS | Performed by: ORTHOPAEDIC SURGERY

## 2024-05-09 PROCEDURE — 25010000002 KETOROLAC TROMETHAMINE PER 15 MG

## 2024-05-09 PROCEDURE — C1776 JOINT DEVICE (IMPLANTABLE): HCPCS | Performed by: ORTHOPAEDIC SURGERY

## 2024-05-09 PROCEDURE — 25010000002 HYDROMORPHONE 1 MG/ML SOLUTION

## 2024-05-09 PROCEDURE — 25010000002 MEPIVACAINE HCL (PF) 1.5 % SOLUTION: Performed by: NURSE ANESTHETIST, CERTIFIED REGISTERED

## 2024-05-09 PROCEDURE — 97116 GAIT TRAINING THERAPY: CPT

## 2024-05-09 PROCEDURE — 97110 THERAPEUTIC EXERCISES: CPT

## 2024-05-09 PROCEDURE — 25810000003 LACTATED RINGERS PER 1000 ML: Performed by: ANESTHESIOLOGY

## 2024-05-09 PROCEDURE — 25810000003 SODIUM CHLORIDE 0.9 % SOLUTION: Performed by: ORTHOPAEDIC SURGERY

## 2024-05-09 PROCEDURE — 25010000002 ROPIVACAINE HCL-NACL 0.2-0.9 % SOLUTION: Performed by: NURSE ANESTHETIST, CERTIFIED REGISTERED

## 2024-05-09 PROCEDURE — 25010000002 DEXAMETHASONE PER 1 MG: Performed by: NURSE ANESTHETIST, CERTIFIED REGISTERED

## 2024-05-09 PROCEDURE — 25010000002 ONDANSETRON PER 1 MG: Performed by: NURSE ANESTHETIST, CERTIFIED REGISTERED

## 2024-05-09 PROCEDURE — 73560 X-RAY EXAM OF KNEE 1 OR 2: CPT

## 2024-05-09 PROCEDURE — 25010000002 MORPHINE SULFATE (PF) 10 MG/ML SOLUTION 1 ML VIAL: Performed by: ORTHOPAEDIC SURGERY

## 2024-05-09 DEVICE — IMPLANTABLE DEVICE: Type: IMPLANTABLE DEVICE | Site: KNEE | Status: FUNCTIONAL

## 2024-05-09 DEVICE — GENESIS II NON-POROUS TIBIAL                                    BASEPLATE SIZE 3 LEFT
Type: IMPLANTABLE DEVICE | Site: KNEE | Status: FUNCTIONAL
Brand: GENESIS II

## 2024-05-09 DEVICE — DEV CONTRL TISS STRATAFIX SYMM PDS PLUS VIL CT-1 60CM: Type: IMPLANTABLE DEVICE | Site: KNEE | Status: FUNCTIONAL

## 2024-05-09 DEVICE — CMT BONE PALACOS R HI/VISC 1X40: Type: IMPLANTABLE DEVICE | Site: KNEE | Status: FUNCTIONAL

## 2024-05-09 DEVICE — GENESIS II RESURFACING PATELLAR                                    PROSTHESIS  29MM
Type: IMPLANTABLE DEVICE | Site: KNEE | Status: FUNCTIONAL
Brand: GENESIS II

## 2024-05-09 DEVICE — DEV CONTRL TISS STRATAFIX SPIRAL MNCRYL PLS PS 3/0 30CM UD: Type: IMPLANTABLE DEVICE | Site: KNEE | Status: FUNCTIONAL

## 2024-05-09 DEVICE — LEGION NARROW POSTERIOR STABILIZED                                    OXINIUM SIZE 5N LEFT
Type: IMPLANTABLE DEVICE | Site: KNEE | Status: FUNCTIONAL
Brand: LEGION

## 2024-05-09 DEVICE — LEGION POSTERIOR STABILIZED HIGH                                    FLEX HIGHLY CROSS LINKED                                    POLYETHYLENE SIZE 3-4 9MM
Type: IMPLANTABLE DEVICE | Site: KNEE | Status: FUNCTIONAL
Brand: LEGION

## 2024-05-09 RX ORDER — LIDOCAINE HYDROCHLORIDE 10 MG/ML
0.5 INJECTION, SOLUTION EPIDURAL; INFILTRATION; INTRACAUDAL; PERINEURAL ONCE AS NEEDED
Status: COMPLETED | OUTPATIENT
Start: 2024-05-09 | End: 2024-05-09

## 2024-05-09 RX ORDER — ONDANSETRON 4 MG/1
4 TABLET, ORALLY DISINTEGRATING ORAL EVERY 6 HOURS PRN
Status: DISCONTINUED | OUTPATIENT
Start: 2024-05-09 | End: 2024-05-09 | Stop reason: HOSPADM

## 2024-05-09 RX ORDER — AMOXICILLIN 250 MG
2 CAPSULE ORAL 2 TIMES DAILY PRN
Status: DISCONTINUED | OUTPATIENT
Start: 2024-05-09 | End: 2024-05-09 | Stop reason: HOSPADM

## 2024-05-09 RX ORDER — FENTANYL CITRATE 50 UG/ML
50 INJECTION, SOLUTION INTRAMUSCULAR; INTRAVENOUS
Status: DISCONTINUED | OUTPATIENT
Start: 2024-05-09 | End: 2024-05-09 | Stop reason: HOSPADM

## 2024-05-09 RX ORDER — HYDROMORPHONE HYDROCHLORIDE 1 MG/ML
0.5 INJECTION, SOLUTION INTRAMUSCULAR; INTRAVENOUS; SUBCUTANEOUS
Status: DISCONTINUED | OUTPATIENT
Start: 2024-05-09 | End: 2024-05-09 | Stop reason: HOSPADM

## 2024-05-09 RX ORDER — KETOROLAC TROMETHAMINE 15 MG/ML
15 INJECTION, SOLUTION INTRAMUSCULAR; INTRAVENOUS EVERY 6 HOURS PRN
Status: DISCONTINUED | OUTPATIENT
Start: 2024-05-09 | End: 2024-05-09 | Stop reason: HOSPADM

## 2024-05-09 RX ORDER — LIDOCAINE HYDROCHLORIDE 10 MG/ML
INJECTION, SOLUTION EPIDURAL; INFILTRATION; INTRACAUDAL; PERINEURAL AS NEEDED
Status: DISCONTINUED | OUTPATIENT
Start: 2024-05-09 | End: 2024-05-09 | Stop reason: SURG

## 2024-05-09 RX ORDER — NALOXONE HCL 0.4 MG/ML
0.1 VIAL (ML) INJECTION
Status: DISCONTINUED | OUTPATIENT
Start: 2024-05-09 | End: 2024-05-09 | Stop reason: HOSPADM

## 2024-05-09 RX ORDER — ROPIVACAINE HYDROCHLORIDE 2 MG/ML
INJECTION, SOLUTION EPIDURAL; INFILTRATION; PERINEURAL CONTINUOUS
Status: DISCONTINUED | OUTPATIENT
Start: 2024-05-09 | End: 2024-05-09 | Stop reason: HOSPADM

## 2024-05-09 RX ORDER — OXYCODONE HYDROCHLORIDE 5 MG/1
5 TABLET ORAL EVERY 4 HOURS PRN
Qty: 30 TABLET | Refills: 0 | Status: SHIPPED | OUTPATIENT
Start: 2024-05-09

## 2024-05-09 RX ORDER — FENTANYL CITRATE 50 UG/ML
INJECTION, SOLUTION INTRAMUSCULAR; INTRAVENOUS
Status: COMPLETED
Start: 2024-05-09 | End: 2024-05-09

## 2024-05-09 RX ORDER — ASPIRIN 81 MG/1
81 TABLET ORAL EVERY 12 HOURS SCHEDULED
Qty: 60 TABLET | Refills: 0 | Status: SHIPPED | OUTPATIENT
Start: 2024-05-10

## 2024-05-09 RX ORDER — TRANEXAMIC ACID 10 MG/ML
1000 INJECTION, SOLUTION INTRAVENOUS ONCE
Status: COMPLETED | OUTPATIENT
Start: 2024-05-09 | End: 2024-05-09

## 2024-05-09 RX ORDER — KETOROLAC TROMETHAMINE 15 MG/ML
INJECTION, SOLUTION INTRAMUSCULAR; INTRAVENOUS
Status: COMPLETED
Start: 2024-05-09 | End: 2024-05-09

## 2024-05-09 RX ORDER — ASPIRIN 81 MG/1
81 TABLET ORAL EVERY 12 HOURS SCHEDULED
Status: DISCONTINUED | OUTPATIENT
Start: 2024-05-10 | End: 2024-05-09 | Stop reason: HOSPADM

## 2024-05-09 RX ORDER — PANTOPRAZOLE SODIUM 40 MG/1
40 TABLET, DELAYED RELEASE ORAL EVERY MORNING
Status: DISCONTINUED | OUTPATIENT
Start: 2024-05-10 | End: 2024-05-09 | Stop reason: HOSPADM

## 2024-05-09 RX ORDER — DOCUSATE SODIUM 100 MG/1
100 CAPSULE, LIQUID FILLED ORAL 2 TIMES DAILY PRN
Status: DISCONTINUED | OUTPATIENT
Start: 2024-05-09 | End: 2024-05-09 | Stop reason: HOSPADM

## 2024-05-09 RX ORDER — ACETAMINOPHEN 500 MG
1000 TABLET ORAL EVERY 6 HOURS
Status: DISCONTINUED | OUTPATIENT
Start: 2024-05-09 | End: 2024-05-09 | Stop reason: HOSPADM

## 2024-05-09 RX ORDER — ACETAMINOPHEN 500 MG
1000 TABLET ORAL ONCE
Status: COMPLETED | OUTPATIENT
Start: 2024-05-09 | End: 2024-05-09

## 2024-05-09 RX ORDER — MELOXICAM 15 MG/1
15 TABLET ORAL DAILY
Qty: 10 TABLET | Refills: 0 | Status: SHIPPED | OUTPATIENT
Start: 2024-05-10

## 2024-05-09 RX ORDER — ONDANSETRON 2 MG/ML
4 INJECTION INTRAMUSCULAR; INTRAVENOUS EVERY 6 HOURS PRN
Status: DISCONTINUED | OUTPATIENT
Start: 2024-05-09 | End: 2024-05-09 | Stop reason: SDUPTHER

## 2024-05-09 RX ORDER — MELOXICAM 15 MG/1
15 TABLET ORAL DAILY
Status: DISCONTINUED | OUTPATIENT
Start: 2024-05-10 | End: 2024-05-09 | Stop reason: HOSPADM

## 2024-05-09 RX ORDER — ONDANSETRON 2 MG/ML
4 INJECTION INTRAMUSCULAR; INTRAVENOUS EVERY 6 HOURS PRN
Status: DISCONTINUED | OUTPATIENT
Start: 2024-05-09 | End: 2024-05-09 | Stop reason: HOSPADM

## 2024-05-09 RX ORDER — PSEUDOEPHEDRINE HCL 30 MG
100 TABLET ORAL 2 TIMES DAILY PRN
Qty: 60 EACH | Refills: 0 | Status: SHIPPED | OUTPATIENT
Start: 2024-05-09

## 2024-05-09 RX ORDER — OXYCODONE HYDROCHLORIDE 10 MG/1
10 TABLET ORAL EVERY 4 HOURS PRN
Status: DISCONTINUED | OUTPATIENT
Start: 2024-05-09 | End: 2024-05-09 | Stop reason: HOSPADM

## 2024-05-09 RX ORDER — FAMOTIDINE 20 MG/1
20 TABLET, FILM COATED ORAL ONCE
Status: COMPLETED | OUTPATIENT
Start: 2024-05-09 | End: 2024-05-09

## 2024-05-09 RX ORDER — MELOXICAM 15 MG/1
15 TABLET ORAL ONCE
Status: COMPLETED | OUTPATIENT
Start: 2024-05-09 | End: 2024-05-09

## 2024-05-09 RX ORDER — SODIUM CHLORIDE 9 MG/ML
100 INJECTION, SOLUTION INTRAVENOUS CONTINUOUS
Status: DISCONTINUED | OUTPATIENT
Start: 2024-05-09 | End: 2024-05-09 | Stop reason: HOSPADM

## 2024-05-09 RX ORDER — MAGNESIUM HYDROXIDE 1200 MG/15ML
LIQUID ORAL AS NEEDED
Status: DISCONTINUED | OUTPATIENT
Start: 2024-05-09 | End: 2024-05-09 | Stop reason: HOSPADM

## 2024-05-09 RX ORDER — SODIUM CHLORIDE, SODIUM LACTATE, POTASSIUM CHLORIDE, CALCIUM CHLORIDE 600; 310; 30; 20 MG/100ML; MG/100ML; MG/100ML; MG/100ML
9 INJECTION, SOLUTION INTRAVENOUS CONTINUOUS
Status: DISCONTINUED | OUTPATIENT
Start: 2024-05-09 | End: 2024-05-09 | Stop reason: HOSPADM

## 2024-05-09 RX ORDER — PHENYLEPHRINE HCL IN 0.9% NACL 1 MG/10 ML
SYRINGE (ML) INTRAVENOUS AS NEEDED
Status: DISCONTINUED | OUTPATIENT
Start: 2024-05-09 | End: 2024-05-09 | Stop reason: SURG

## 2024-05-09 RX ORDER — LEVOTHYROXINE SODIUM 0.07 MG/1
75 TABLET ORAL
Status: DISCONTINUED | OUTPATIENT
Start: 2024-05-10 | End: 2024-05-09 | Stop reason: HOSPADM

## 2024-05-09 RX ORDER — BUPIVACAINE HYDROCHLORIDE 2.5 MG/ML
INJECTION, SOLUTION EPIDURAL; INFILTRATION; INTRACAUDAL
Status: DISCONTINUED | OUTPATIENT
Start: 2024-05-09 | End: 2024-05-09 | Stop reason: SURG

## 2024-05-09 RX ORDER — MIDAZOLAM HYDROCHLORIDE 1 MG/ML
1 INJECTION INTRAMUSCULAR; INTRAVENOUS
Status: DISCONTINUED | OUTPATIENT
Start: 2024-05-09 | End: 2024-05-09 | Stop reason: HOSPADM

## 2024-05-09 RX ORDER — DEXAMETHASONE SODIUM PHOSPHATE 4 MG/ML
INJECTION, SOLUTION INTRA-ARTICULAR; INTRALESIONAL; INTRAMUSCULAR; INTRAVENOUS; SOFT TISSUE AS NEEDED
Status: DISCONTINUED | OUTPATIENT
Start: 2024-05-09 | End: 2024-05-09 | Stop reason: SURG

## 2024-05-09 RX ORDER — ONDANSETRON 2 MG/ML
INJECTION INTRAMUSCULAR; INTRAVENOUS AS NEEDED
Status: DISCONTINUED | OUTPATIENT
Start: 2024-05-09 | End: 2024-05-09 | Stop reason: SURG

## 2024-05-09 RX ORDER — OXYCODONE HYDROCHLORIDE 5 MG/1
5 TABLET ORAL EVERY 4 HOURS PRN
Status: DISCONTINUED | OUTPATIENT
Start: 2024-05-09 | End: 2024-05-09 | Stop reason: HOSPADM

## 2024-05-09 RX ORDER — DIPHENHYDRAMINE HYDROCHLORIDE 50 MG/ML
25 INJECTION INTRAMUSCULAR; INTRAVENOUS EVERY 6 HOURS PRN
Status: DISCONTINUED | OUTPATIENT
Start: 2024-05-09 | End: 2024-05-09 | Stop reason: HOSPADM

## 2024-05-09 RX ORDER — BISACODYL 10 MG
10 SUPPOSITORY, RECTAL RECTAL DAILY PRN
Status: DISCONTINUED | OUTPATIENT
Start: 2024-05-09 | End: 2024-05-09 | Stop reason: HOSPADM

## 2024-05-09 RX ORDER — BISACODYL 5 MG/1
10 TABLET, DELAYED RELEASE ORAL DAILY PRN
Status: DISCONTINUED | OUTPATIENT
Start: 2024-05-09 | End: 2024-05-09 | Stop reason: HOSPADM

## 2024-05-09 RX ORDER — ACETAMINOPHEN 500 MG
1000 TABLET ORAL EVERY 8 HOURS
Qty: 42 TABLET | Refills: 0 | Status: SHIPPED | OUTPATIENT
Start: 2024-05-09

## 2024-05-09 RX ORDER — OXYCODONE HYDROCHLORIDE 5 MG/1
TABLET ORAL
Status: COMPLETED
Start: 2024-05-09 | End: 2024-05-09

## 2024-05-09 RX ORDER — PROPOFOL 10 MG/ML
VIAL (ML) INTRAVENOUS AS NEEDED
Status: DISCONTINUED | OUTPATIENT
Start: 2024-05-09 | End: 2024-05-09 | Stop reason: SURG

## 2024-05-09 RX ORDER — PREGABALIN 75 MG/1
75 CAPSULE ORAL ONCE
Status: COMPLETED | OUTPATIENT
Start: 2024-05-09 | End: 2024-05-09

## 2024-05-09 RX ORDER — LABETALOL HYDROCHLORIDE 5 MG/ML
10 INJECTION, SOLUTION INTRAVENOUS EVERY 4 HOURS PRN
Status: DISCONTINUED | OUTPATIENT
Start: 2024-05-09 | End: 2024-05-09 | Stop reason: HOSPADM

## 2024-05-09 RX ORDER — DIPHENHYDRAMINE HCL 25 MG
25 CAPSULE ORAL EVERY 6 HOURS PRN
Status: DISCONTINUED | OUTPATIENT
Start: 2024-05-09 | End: 2024-05-09 | Stop reason: HOSPADM

## 2024-05-09 RX ORDER — ROPIVACAINE HYDROCHLORIDE 2 MG/ML
1 INJECTION, SOLUTION EPIDURAL; INFILTRATION; PERINEURAL CONTINUOUS
Start: 2024-05-09

## 2024-05-09 RX ORDER — ONDANSETRON 2 MG/ML
4 INJECTION INTRAMUSCULAR; INTRAVENOUS ONCE AS NEEDED
Status: DISCONTINUED | OUTPATIENT
Start: 2024-05-09 | End: 2024-05-09 | Stop reason: HOSPADM

## 2024-05-09 RX ADMIN — TRANEXAMIC ACID 1000 MG: 10 INJECTION, SOLUTION INTRAVENOUS at 09:00

## 2024-05-09 RX ADMIN — SODIUM CHLORIDE 100 ML/HR: 9 INJECTION, SOLUTION INTRAVENOUS at 15:07

## 2024-05-09 RX ADMIN — SODIUM CHLORIDE, POTASSIUM CHLORIDE, SODIUM LACTATE AND CALCIUM CHLORIDE 9 ML/HR: 600; 310; 30; 20 INJECTION, SOLUTION INTRAVENOUS at 07:00

## 2024-05-09 RX ADMIN — OXYCODONE HYDROCHLORIDE 5 MG: 5 TABLET ORAL at 10:41

## 2024-05-09 RX ADMIN — ACETAMINOPHEN 1000 MG: 500 TABLET ORAL at 06:58

## 2024-05-09 RX ADMIN — KETOROLAC TROMETHAMINE 15 MG: 15 INJECTION, SOLUTION INTRAMUSCULAR; INTRAVENOUS at 10:29

## 2024-05-09 RX ADMIN — Medication 100 MCG: at 08:38

## 2024-05-09 RX ADMIN — PROPOFOL 55 MCG/KG/MIN: 10 INJECTION, EMULSION INTRAVENOUS at 07:55

## 2024-05-09 RX ADMIN — DEXAMETHASONE SODIUM PHOSPHATE 8 MG: 4 INJECTION INTRA-ARTICULAR; INTRALESIONAL; INTRAMUSCULAR; INTRAVENOUS; SOFT TISSUE at 07:33

## 2024-05-09 RX ADMIN — LIDOCAINE HYDROCHLORIDE 50 MG: 10 INJECTION, SOLUTION EPIDURAL; INFILTRATION; INTRACAUDAL; PERINEURAL at 07:33

## 2024-05-09 RX ADMIN — PROPOFOL 77 MCG/KG/MIN: 10 INJECTION, EMULSION INTRAVENOUS at 07:33

## 2024-05-09 RX ADMIN — TRANEXAMIC ACID 1000 MG: 10 INJECTION, SOLUTION INTRAVENOUS at 07:33

## 2024-05-09 RX ADMIN — FAMOTIDINE 20 MG: 20 TABLET, FILM COATED ORAL at 06:58

## 2024-05-09 RX ADMIN — LIDOCAINE HYDROCHLORIDE 0.5 ML: 10 INJECTION, SOLUTION EPIDURAL; INFILTRATION; INTRACAUDAL; PERINEURAL at 07:00

## 2024-05-09 RX ADMIN — HYDROMORPHONE HYDROCHLORIDE 0.5 MG: 1 INJECTION, SOLUTION INTRAMUSCULAR; INTRAVENOUS; SUBCUTANEOUS at 10:33

## 2024-05-09 RX ADMIN — HYDROMORPHONE HYDROCHLORIDE 0.5 MG: 1 INJECTION, SOLUTION INTRAMUSCULAR; INTRAVENOUS; SUBCUTANEOUS at 10:44

## 2024-05-09 RX ADMIN — FENTANYL CITRATE 50 MCG: 50 INJECTION, SOLUTION INTRAMUSCULAR; INTRAVENOUS at 09:52

## 2024-05-09 RX ADMIN — MELOXICAM 15 MG: 15 TABLET ORAL at 06:58

## 2024-05-09 RX ADMIN — ONDANSETRON 4 MG: 2 INJECTION INTRAMUSCULAR; INTRAVENOUS at 07:33

## 2024-05-09 RX ADMIN — MEPIVACAINE HYDROCHLORIDE 2 ML: 15 INJECTION, SOLUTION EPIDURAL; INFILTRATION at 07:33

## 2024-05-09 RX ADMIN — SODIUM CHLORIDE 2000 MG: 900 INJECTION INTRAVENOUS at 15:05

## 2024-05-09 RX ADMIN — PROPOFOL 40 MG: 10 INJECTION, EMULSION INTRAVENOUS at 07:33

## 2024-05-09 RX ADMIN — BUPIVACAINE HYDROCHLORIDE 20 ML: 2.5 INJECTION, SOLUTION EPIDURAL; INFILTRATION; INTRACAUDAL; PERINEURAL at 09:25

## 2024-05-09 RX ADMIN — ONDANSETRON 4 MG: 4 TABLET, ORALLY DISINTEGRATING ORAL at 14:05

## 2024-05-09 RX ADMIN — Medication 1000 MG: at 09:26

## 2024-05-09 RX ADMIN — SODIUM CHLORIDE, POTASSIUM CHLORIDE, SODIUM LACTATE AND CALCIUM CHLORIDE: 600; 310; 30; 20 INJECTION, SOLUTION INTRAVENOUS at 09:02

## 2024-05-09 RX ADMIN — PREGABALIN 75 MG: 75 CAPSULE ORAL at 06:58

## 2024-05-09 RX ADMIN — Medication 100 MCG: at 07:56

## 2024-05-09 RX ADMIN — SODIUM CHLORIDE 2000 MG: 900 INJECTION INTRAVENOUS at 07:33

## 2024-05-09 RX ADMIN — OXYCODONE 5 MG: 5 TABLET ORAL at 10:41

## 2024-05-09 RX ADMIN — ACETAMINOPHEN 1000 MG: 500 TABLET ORAL at 15:04

## 2024-05-09 NOTE — H&P
[facility] PREOPERATIVE HISTORY AND PHYSICAL       Chief complaint:  Left knee pain    Subjective:    Patient is a 64 y.o.female presents with history of chronic progressively worsening left knee pain.  She has clinical and radiographic evidence of end-stage left knee joint degeneration. Conservative measures have been tried for 3 months or longer, but have failed to adequately treat or improve the patient's symptoms. Pain is restricting the patient's daily activities as well as quality of life. See office note dated 2/21/2024.  The patient is here today for scheduled/consented TOTAL KNEE ARTHROPLASTY WITH CORI ROBOT - LEFT by Dr. German.      Associated Documents:      Scan on 5/8/2024 0749 by [employer] Onbase, [place]: FOLLOW UP LEFT KNEE PROBLEM, [place] [facility], 02/21/2024         Review of Systems:  General ROS: negative for fever, chills, weakness, dizziness, headache, fatigue, weight farhan arthroplastynges  Cardiovascular ROS: no chest pain or dyspnea on exertion  Respiratory ROS: no cough, shortness of breath, or wheezing  GI ROS: no abdominal pain/discomfort, nausea, vomiting or diarrhea   ROS: no dysuria, hematuria or complaints  Skin ROS: no itching, rash or open wounds.        Allergies:   Allergies   Allergen Reactions    Garlic GI Intolerance   Latex: no known allergy  Contrast Dye:  no known allergy      Home Meds    Medications Prior to Admission   Medication Sig Dispense Refill Last Dose    levothyroxine (SYNTHROID, LEVOTHROID) 75 MCG tablet Take 1 tablet by mouth Every Morning.   5/9/2024    omeprazole (priLOSEC) 40 MG capsule Take 1 capsule by mouth Daily.   5/9/2024    ibuprofen (ADVIL,MOTRIN) 200 MG tablet Take 3 tablets by mouth Every 8 (Eight) Hours As Needed for Mild Pain.   5/2/2024     PMH:   Past Medical History:   Diagnosis Date    Anxiety     Arthritis     Endometriosis     GERD (gastroesophageal reflux disease)     History of panic attacks     Hypothyroid      Palpitations     anxiety induced    PONV (postoperative nausea and vomiting)     Uterine leiomyoma      PSH:    Past Surgical History:   Procedure Laterality Date    ENDOSCOPY N/A 02/10/2023    Procedure: ESOPHAGOGASTRODUODENOSCOPY with biopsy;  Surgeon: Ezra Iniguez MD;  Location: [facility] ENDOSCOPY;  Service: Gastroenterology;  Laterality: N/A;    EXPLORATORY LAPAROTOMY  2023    Patient said she had several prior to hysterectomy    HYSTERECTOMY Bilateral     Total Hysterectomy    LAPAROSCOPIC CHOLECYSTECTOMY  03/15/2022    MYOMECTOMY      OOPHORECTOMY      TOTAL KNEE ARTHROPLASTY Right 2021    Procedure: TOTAL KNEE ARTHROPLASTY RIGHT;  Surgeon: Fernando German MD;  Location: [facility] OR;  Service: Orthopedics;  Laterality: Right;     Immunization History: pneumococcal=no  influenza=no  Tetanus=yes     Social History:  Social History     Tobacco Use    Smoking status: Former     Current packs/day: 0.00     Types: Cigarettes     Quit date:      Years since quittin.3    Smokeless tobacco: Never   Substance Use Topics    Alcohol use: No           Physical Exam:/86 (BP Location: Right arm, Patient Position: Lying)   Pulse 72   Temp 98 °F (36.7 °C) (Temporal)   Resp 16   SpO2 96%       General Appearance:    Alert, cooperative, no distress, appears stated age   Head:    Normocephalic, without obvious abnormality, atraumatic   Lungs:     Clear to auscultation bilaterally, respirations unlabored    Heart: Regular rate and rhythm, S1 and S2 normal, no murmur, rub    or gallop    Abdomen:    Soft without tenderness  +bowel sounds   Breast Exam:    deferred   Genitalia:    deferred   Extremities:   Extremities normal, atraumatic, no cyanosis or edema   Skin:   Skin color, texture, turgor normal, no rashes or lesions   Neurologic:   Grossly intact     Results Review:   LABS:  Lab Results   Component Value Date    WBC 7.50 2024    HGB 15.1 2024    HCT 45.6 2024     MCV 89.2 04/26/2024     04/26/2024    NEUTROABS 4.36 02/05/2024    GLUCOSE 96 04/26/2024    BUN 14 04/26/2024    CREATININE 0.59 04/26/2024    EGFRIFNONA 113 05/14/2021    EGFRIFAFRI 124 03/19/2021     04/26/2024    K 4.2 04/26/2024     04/26/2024    CO2 26.0 04/26/2024    CALCIUM 9.3 04/26/2024    ALBUMIN 4.4 04/18/2024    AST 24 04/18/2024    ALT 27 04/18/2024    BILITOT 0.5 04/18/2024       RADIOLOGY:  Imaging Results (Last 72 Hours)       ** No results found for the last 72 hours. **               Cancer Staging (if applicable):  Cancer Patient: __ yes __no __unknown; If yes, clinical stage T:__ N:__M:__, stage group    Impression:  PRIMARY OSTEOARTHRITIS OF LEFT KNEE    Plan:  TOTAL KNEE ARTHROPLASTY WITH CORI ROBOT - LEFT     KAT Caldera 5/9/2024 07:08 EDT

## 2024-05-09 NOTE — THERAPY EVALUATION
Patient Name: Monique Tierney  : 1960    MRN: [id number]                              Today's Date: 2024       Admit Date: 2024    Visit Dx:     ICD-10-CM ICD-9-CM   1. Status post left knee replacement  Z96.652 V43.65     Patient Active Problem List   Diagnosis    Goiter    Hypothyroidism    Biliary dyskinesia    Status post ARTHUR-BSO    Prediabetes    Osteoarthritis of both knees    Chronic pain of left thumb    Skipped beats    S/P total knee arthroplasty, right    Anxiety    Class 2 obesity due to excess calories without serious comorbidity with body mass index (BMI) of 39.0 to 39.9 in adult    Gastroesophageal reflux disease    Primary localized osteoarthritis of left knee    Status post left knee replacement     Past Medical History:   Diagnosis Date    Anxiety     Arthritis     Endometriosis     GERD (gastroesophageal reflux disease)     History of panic attacks     Hypothyroid     Palpitations     anxiety induced    PONV (postoperative nausea and vomiting)     Uterine leiomyoma      Past Surgical History:   Procedure Laterality Date    ENDOSCOPY N/A 02/10/2023    Procedure: ESOPHAGOGASTRODUODENOSCOPY with biopsy;  Surgeon: Ezra Iniguez MD;  Location: [facility] ENDOSCOPY;  Service: Gastroenterology;  Laterality: N/A;    EXPLORATORY LAPAROTOMY  2023    Patient said she had several prior to hysterectomy    HYSTERECTOMY Bilateral     Total Hysterectomy    LAPAROSCOPIC CHOLECYSTECTOMY  03/15/2022    MYOMECTOMY      OOPHORECTOMY      TOTAL KNEE ARTHROPLASTY Right 2021    Procedure: TOTAL KNEE ARTHROPLASTY RIGHT;  Surgeon: Fernando German MD;  Location: [facility] OR;  Service: Orthopedics;  Laterality: Right;      General Information       Row Name 24 1406          Physical Therapy Time and Intention    Document Type evaluation  -HW     Mode of Treatment physical therapy  -       Row Name 24 1406          General Information    Patient Profile Reviewed yes  -HW      Prior Level of Function min assist:;all household mobility;community mobility;gait;transfer;bed mobility;ADL's  caregiver for mother  -     Existing Precautions/Restrictions fall;other (see comments)  adductor canal nerve cath; NWB on RUE with brace  -     Barriers to Rehab none identified  -       Row Name 05/09/24 1406          Living Environment    People in Home parent(s);other (see comments)  pt is caregiver for mother; sister planning on assisting at d/c  -       Row Name 05/09/24 1406          Home Main Entrance    Number of Stairs, Main Entrance other (see comments)  Ramp  -       Row Name 05/09/24 1406          Stairs Within Home, Primary    Stairs, Within Home, Primary BR in basement  -     Number of Stairs, Within Home, Primary twelve  -       Row Name 05/09/24 1406          Cognition    Orientation Status (Cognition) oriented x 3  -       Row Name 05/09/24 1406          Safety Issues, Functional Mobility    Safety Issues Affecting Function (Mobility) awareness of need for assistance;insight into deficits/self-awareness  -     Impairments Affecting Function (Mobility) balance;endurance/activity tolerance;pain;range of motion (ROM);strength  -               User Key  (r) = Recorded By, (t) = Taken By, (c) = Cosigned By      Initials Name Provider Type     Anna Thomason PT Physical Therapist                   Mobility       Row Name 05/09/24 1409          Bed Mobility    Bed Mobility supine-sit  -     Supine-Sit [place] (Bed Mobility) verbal cues;nonverbal cues (demo/gesture)  -     Assistive Device (Bed Mobility) bed rails;draw sheet;head of bed elevated  -     Comment, (Bed Mobility) Difficulty scooting OOB without putting weight on R wrist. Encouraged to lean and use forearm instead of R hand  -       Row Name 05/09/24 1409          Transfers    Comment, (Transfers) Pt performed STS from EOB and from toilet with VC for hand placement to avoid WB throught RUE.  -        Row Name 05/09/24 1409          Sit-Stand Transfer    Sit-Stand [place] (Transfers) contact guard;nonverbal cues (demo/gesture);verbal cues;2 person assist  -     Assistive Device (Sit-Stand Transfers) walker, rolling platform  -       Row Name 05/09/24 1409          Gait/Stairs (Locomotion)    [place] Level (Gait) contact guard;nonverbal cues (demo/gesture);verbal cues;2 person assist  -     Assistive Device (Gait) walker, rolling platform  -     Patient was able to Ambulate yes  -     Distance in Feet (Gait) 80  -     Deviations/Abnormal Patterns (Gait) bilateral deviations;base of support, wide;weight shifting decreased;stride length decreased;gait speed decreased  -     Bilateral Gait Deviations forward flexed posture;heel strike decreased  -     Left Sided Gait Deviations knee hyperextension  -     Comment, (Gait/Stairs) Pt amb in macario with step-through gait pattern. Short stride length, heavy reliance on FWW, slow gait speed, and forward flexed posture noted. VC for increased heel strike, increased L active knee extension in stance phase, and upright posture. Activity limited by reports of nausea. Pt returned to relined position. No significant drop in BP recorded. No knee buckling or LOB observed. L knee hyperextension noted abd painful.  -       Row Name 05/09/24 1409          Mobility    Extremity Weight-bearing Status left lower extremity  -     Left Lower Extremity (Weight-bearing Status) weight-bearing as tolerated (WBAT)  -               User Key  (r) = Recorded By, (t) = Taken By, (c) = Cosigned By      Initials Name Provider Type     Anna Thomason PT Physical Therapist                   Obj/Interventions       Row Name 05/09/24 142          Range of Motion Comprehensive    General Range of Motion lower extremity range of motion deficits identified  -     Comment, General Range of Motion Surgical knee ROM: 12-68  -       Row Name 05/09/24 1426           Strength Comprehensive (MMT)    General Manual Muscle Testing (MMT) Assessment lower extremity strength deficits identified  -     Comment, General Manual Muscle Testing (MMT) Assessment Pt able to perform B active ankle DF and SLR  -[facility] Name 05/09/24 1422          Motor Skills    Therapeutic Exercise knee;ankle  -[facility] Name 05/09/24 1422          Knee (Therapeutic Exercise)    Knee (Therapeutic Exercise) isometric exercises;strengthening exercise  -     Knee Isometrics (Therapeutic Exercise) quad sets;left;10 repetitions  -     Knee Strengthening (Therapeutic Exercise) SLR (straight leg raise);SAQ (short arc quad);LAQ (long arc quad);heel slides;left;10 repetitions  -[facility] Name 05/09/24 1422          Ankle (Therapeutic Exercise)    Ankle (Therapeutic Exercise) AROM (active range of motion)  -     Ankle AROM (Therapeutic Exercise) bilateral;dorsiflexion;plantarflexion;10 repetitions  -[facility] Name 05/09/24 1422          Balance    Balance Assessment sitting static balance;sitting dynamic balance;sit to stand dynamic balance;standing static balance;standing dynamic balance  -     Static Sitting Balance standby assist  -     Dynamic Sitting Balance standby assist  -     Position, Sitting Balance sitting edge of bed  -     Static Standing Balance contact guard  -     Dynamic Standing Balance contact guard  -     Position/Device Used, Standing Balance supported;walker, rolling  -     Balance Interventions sitting;standing;sit to stand;occupation based/functional task  -[facility] Name 05/09/24 1422          Sensory Assessment (Somatosensory)    Sensory Assessment (Somatosensory) LE sensation intact  -               User Key  (r) = Recorded By, (t) = Taken By, (c) = Cosigned By      Initials Name Provider Type     Anna Thomason, ANNIE Physical Therapist                   Goals/Plan       [facility] Name 05/09/24 1425          Bed Mobility Goal 1 (PT)    Activity/Assistive  Device (Bed Mobility Goal 1, PT) sit to supine/supine to sit  -HW     [place] Level/Cues Needed (Bed Mobility Goal 1, PT) modified independence  -HW     Time Frame (Bed Mobility Goal 1, PT) long term goal (LTG);3 days  -       Row Name 05/09/24 1425          Transfer Goal 1 (PT)    Activity/Assistive Device (Transfer Goal 1, PT) sit-to-stand/stand-to-sit  -HW     [place] Level/Cues Needed (Transfer Goal 1, PT) modified independence  -HW     Time Frame (Transfer Goal 1, PT) long term goal (LTG);3 days  -       Row Name 05/09/24 1425          Gait Training Goal 1 (PT)    Activity/Assistive Device (Gait Training Goal 1, PT) gait (walking locomotion)  -HW     [place] Level (Gait Training Goal 1, PT) modified independence  -HW     Distance (Gait Training Goal 1, PT) 500  -HW     Time Frame (Gait Training Goal 1, PT) long term goal (LTG);3 days  -       Row Name 05/09/24 1425          ROM Goal 1 (PT)    ROM Goal 1 (PT) Surgical Knee ROM: 0-90  -HW     Time Frame (ROM Goal 1, PT) long-term goal (LTG);3 days  -       Row Name 05/09/24 1425          Stairs Goal 1 (PT)    Activity/Assistive Device (Stairs Goal 1, PT) ascending stairs;descending stairs  -     [place] Level/Cues Needed (Stairs Goal 1, PT) modified independence  -HW     Number of Stairs (Stairs Goal 1, PT) 13  -HW     Time Frame (Stairs Goal 1, PT) long term goal (LTG);3 days  -       Row Name 05/09/24 1425          Therapy Assessment/Plan (PT)    Planned Therapy Interventions (PT) balance training;bed mobility training;gait training;home exercise program;ROM (range of motion);patient/family education;stair training;strengthening;stretching;transfer training  -               User Key  (r) = Recorded By, (t) = Taken By, (c) = Cosigned By      Initials Name Provider Type    HW Anna Thomason, PT Physical Therapist                   Clinical Impression       Row Name 05/09/24 1423          Pain    Pretreatment Pain Rating 7/10  -      Posttreatment Pain Rating 7/10  -     Pain Location - Side/Orientation Left  -     Pain Location generalized  -     Pain Location - knee  -     Pain Intervention(s) Ambulation/increased activity;Repositioned;Elevated;Cold applied  -       Row Name 05/09/24 1423          Plan of Care Review    Plan of Care Reviewed With patient;sibling  -     Progress no change  -     Outcome Evaluation Pt amb 80' with FWW and CGAx2. No knee buckling or LOB observed. L knee hyperextension noted. Activity limited by nausea. HEP and precautions reviewed. Frequent ambulation with nsg encouraged. Recommend d/c home with assist and OPPT when medically appropriate. Plan to assess stairs prior to d/c.  -       Row Name 05/09/24 1423          Therapy Assessment/Plan (PT)    Rehab Potential (PT) good, to achieve stated therapy goals  -     Criteria for Skilled Interventions Met (PT) yes;meets criteria;skilled treatment is necessary  -     Therapy Frequency (PT) 2 times/day  -       Row Name 05/09/24 1423          Vital Signs    Pre Systolic BP Rehab 100  -HW     Pre Treatment Diastolic BP 66  -HW     Post Systolic BP Rehab 121  -HW     Post Treatment Diastolic BP 67  -HW     Pre Patient Position Supine  -     Intra Patient Position Standing  -     Post Patient Position Sitting  -[facility] Name 05/09/24 1423          Positioning and Restraints    Pre-Treatment Position in bed  -     Post Treatment Position chair  -HW     In Chair notified nsg;reclined;sitting;call light within reach;encouraged to call for assist;exit alarm on;with family/caregiver;legs elevated;compression device  -               User Key  (r) = Recorded By, (t) = Taken By, (c) = Cosigned By      Initials Name Provider Type     Anna Thomason, PT Physical Therapist                   Outcome Measures       Row Name 05/09/24 1424          How much help from another person do you currently need...    Turning from your back to your side while in  flat bed without using bedrails? 3  -HW     Moving from lying on back to sitting on the side of a flat bed without bedrails? 3  -HW     Moving to and from a bed to a chair (including a wheelchair)? 3  -HW     Standing up from a chair using your arms (e.g., wheelchair, bedside chair)? 3  -HW     Climbing 3-5 steps with a railing? 3  -HW     To walk in hospital room? 3  -HW     AM-PAC 6 Clicks Score (PT) 18  -HW     Highest Level of Mobility Goal 6 --> Walk 10 steps or more  -       Row Name 05/09/24 1425          PADD    Diagnosis 1  -     Gender 1  -     Age Group 2  -HW     Gait Distance 0  -     Assist Level 1  -     Home Support 3  -HW     PADD Score 8  -HW     Patient Preference home with outpatient rehab  -     Prediction by PADD Score directly home (with home health or out-patient rehab)  -       Row Name 05/09/24 1425          Functional Assessment    Outcome Measure Options AM-PAC 6 Clicks Basic Mobility (PT);PADD  -               User Key  (r) = Recorded By, (t) = Taken By, (c) = Cosigned By      Initials Name Provider Type     Anna Thomason PT Physical Therapist                                 Physical Therapy Education       Title: PT OT SLP Therapies (In Progress)       Topic: Physical Therapy (Done)       Point: Mobility training (Done)       Learning Progress Summary             Patient Acceptance, E,D, VU,DU by  at 5/9/2024 1425                         Point: Home exercise program (Done)       Learning Progress Summary             Patient Acceptance, E,D, VU,DU by  at 5/9/2024 1425                         Point: Body mechanics (Done)       Learning Progress Summary             Patient Acceptance, E,D, VU,DU by  at 5/9/2024 1425                         Point: Precautions (Done)       Learning Progress Summary             Patient Acceptance, E,D, VU,DU by  at 5/9/2024 1425                                         User Key       Initials Effective Dates Name Provider Type  Discipline     12/15/23 -  Anna Thomason PT Physical Therapist PT                  PT Recommendation and Plan  Planned Therapy Interventions (PT): balance training, bed mobility training, gait training, home exercise program, ROM (range of motion), patient/family education, stair training, strengthening, stretching, transfer training  Plan of Care Reviewed With: patient, sibling  Progress: no change  Outcome Evaluation: Pt amb 80' with FWW and CGAx2. No knee buckling or LOB observed. L knee hyperextension noted. Activity limited by nausea. HEP and precautions reviewed. Frequent ambulation with nsg encouraged. Recommend d/c home with assist and OPPT when medically appropriate. Plan to assess stairs prior to d/c.     Time Calculation:   PT Evaluation Complexity  History, PT Evaluation Complexity: 1-2 personal factors and/or comorbidities  Examination of Body Systems (PT Eval Complexity): total of 3 or more elements  Clinical Presentation (PT Evaluation Complexity): evolving  Clinical Decision Making (PT Evaluation Complexity): moderate complexity  Overall Complexity (PT Evaluation Complexity): moderate complexity     PT Charges       Row Name 05/09/24 1426             Time Calculation    Start Time 1323  -HW      PT Received On 05/09/24  -      PT Goal Re-Cert Due Date 05/19/24  -         Timed Charges    88144 - PT Therapeutic Exercise Minutes 12  -      83976 - Gait Training Minutes  12  -         Untimed Charges    PT Eval/Re-eval Minutes 36  -         Total Minutes    Timed Charges Total Minutes 24  -      Untimed Charges Total Minutes 36  -       Total Minutes 60  -HW                User Key  (r) = Recorded By, (t) = Taken By, (c) = Cosigned By      Initials Name Provider Type     Anna Thomason PT Physical Therapist                  Therapy Charges for Today       Code Description Service Date Service Provider Modifiers Qty    34601771773  PT THER PROC EA 15 MIN 5/9/2024 Anna Thomason, PT GP 1     [id number]  GAIT TRAINING EA 15 MIN 5/9/2024 Anna Thomason, PT GP 1    45491780841 HC PT THER SUPP EA 15 MIN 5/9/2024 Anna Thomason, PT GP 3    [id number] HC PT EVAL MOD COMPLEXITY 3 5/9/2024 Anna Thomason, PT GP 1            PT G-Codes  Outcome Measure Options: AM-PAC 6 Clicks Basic Mobility (PT), PADD  AM-PAC 6 Clicks Score (PT): 18  PT Discharge Summary  Anticipated Discharge Disposition (PT): home with assist, home with outpatient therapy services    Anna Thomason, PT  5/9/2024

## 2024-05-09 NOTE — H&P
Patient Name: Monique Tierney  MRN: [id number]  : 1960  DOS: 2024    Attending: Fernando German,*    Primary Care Provider: Rosana Toro MD      Chief complaint: Left knee pain    Subjective   Patient is a pleasant 64 y.o. female presented for scheduled surgery by Dr. German.  She underwent left total knee arthroplasty under spinal anesthesia.  She tolerated surgery well and was admitted for further medical management.  Her knee has been painful for many years.  She denies use of assistive device for ambulation or recent falls.    When seen postop she is doing well.  Her pain is well-controlled.  She denies nausea, shortness of breath or chest pain.  No history of DVT or PE.    Allergies:  Allergies   Allergen Reactions    Garlic GI Intolerance       Meds:  Medications Prior to Admission   Medication Sig Dispense Refill Last Dose    levothyroxine (SYNTHROID, LEVOTHROID) 75 MCG tablet Take 1 tablet by mouth Every Morning.   2024    omeprazole (priLOSEC) 40 MG capsule Take 1 capsule by mouth Daily.   2024    ibuprofen (ADVIL,MOTRIN) 200 MG tablet Take 3 tablets by mouth Every 8 (Eight) Hours As Needed for Mild Pain.   2024         History:   Past Medical History:   Diagnosis Date    Anxiety     Arthritis     Endometriosis     GERD (gastroesophageal reflux disease)     History of panic attacks     Hypothyroid     Palpitations     anxiety induced    PONV (postoperative nausea and vomiting)     Uterine leiomyoma      Past Surgical History:   Procedure Laterality Date    ENDOSCOPY N/A 02/10/2023    Procedure: ESOPHAGOGASTRODUODENOSCOPY with biopsy;  Surgeon: Ezra Iniguez MD;  Location: [facility] ENDOSCOPY;  Service: Gastroenterology;  Laterality: N/A;    EXPLORATORY LAPAROTOMY  2023    Patient said she had several prior to hysterectomy    HYSTERECTOMY Bilateral     Total Hysterectomy    LAPAROSCOPIC CHOLECYSTECTOMY  03/15/2022    MYOMECTOMY      OOPHORECTOMY      TOTAL KNEE  ARTHROPLASTY Right 2021    Procedure: TOTAL KNEE ARTHROPLASTY RIGHT;  Surgeon: Fernando German MD;  Location: [facility];  Service: Orthopedics;  Laterality: Right;     Family History   Problem Relation Age of Onset    Arthritis Mother     Diabetes Mother     Hypertension Mother     Thyroid disease Mother     Hyperlipidemia Mother     Heart attack Father     Heart disease Father     Cancer Maternal Aunt     Breast cancer Neg Hx     Ovarian cancer Neg Hx      Social History     Tobacco Use    Smoking status: Former     Current packs/day: 0.00     Types: Cigarettes     Quit date:      Years since quittin.3    Smokeless tobacco: Never   Vaping Use    Vaping status: Never Used   Substance Use Topics    Alcohol use: No    Drug use: No   She lives with her mother.  She has no children.  She is retired .    Review of Systems  All systems were reviewed and negative except for:  Gastrointestinal: positive for  diarrhea    Vital Signs  /51 (BP Location: Right arm, Patient Position: Sitting)   Pulse 60   Temp 97.5 °F (36.4 °C) (Oral)   Resp 18   SpO2 97%     Physical Exam:    General Appearance:    Alert, cooperative, in no acute distress   Head:    Normocephalic, without obvious abnormality, atraumatic   Eyes:            Lids and lashes normal, conjunctivae and sclerae normal, no   icterus, no pallor, corneas clear,    Ears:    Ears appear intact with no abnormalities noted   Throat:   No oral lesions, no thrush, oral mucosa moist   Neck:   No adenopathy, supple, trachea midline, no thyromegaly    Lungs:     Clear to auscultation,respirations regular, even and unlabored    Heart:    Regular rhythm and normal rate, normal S1 and S2, no murmur, no gallop   Abdomen:     Normal bowel sounds, no masses, no organomegaly, soft non-tender, non-distended, no guarding, no rebound  tenderness   Genitalia:    Deferred   Extremities: Left knee Ace wrap CDI.  Nerve block present.   Pulses:   Pulses  palpable and equal bilaterally   Skin:   No bleeding, bruising or rash   Neurologic:   Cranial nerves 2 - 12 grossly intact. Flexion and dorsiflexion intact bilateral feet.        I reviewed the patient's new clinical results.     Lab Results   Component Value Date    HGBA1C 5.50 04/26/2024      Latest Reference Range & Units 04/26/24 14:28   Sodium 136 - 145 mmol/L 140   Potassium 3.5 - 5.2 mmol/L 4.2   Chloride 98 - 107 mmol/L 103   CO2 22.0 - 29.0 mmol/L 26.0   Anion Gap 5.0 - 15.0 mmol/L 11.0   BUN 8 - 23 mg/dL 14   Creatinine 0.57 - 1.00 mg/dL 0.59   BUN/Creatinine Ratio 7.0 - 25.0  23.7   eGFR >60.0 mL/min/1.73 102.0   Glucose 65 - 99 mg/dL 96   Calcium 8.6 - 10.5 mg/dL 9.3   Hemoglobin A1C 4.80 - 5.60 % 5.50   WBC 3.40 - 10.80 10*3/mm3 7.50   RBC 3.77 - 5.28 10*6/mm3 5.11   Hemoglobin 12.0 - 15.9 g/dL 15.1   Hematocrit 34.0 - 46.6 % 45.6   Platelets 140 - 450 10*3/mm3 220   RDW 12.3 - 15.4 % 12.4   MCV 79.0 - 97.0 fL 89.2   MCH 26.6 - 33.0 pg 29.5   MCHC 31.5 - 35.7 g/dL 33.1   MPV 6.0 - 12.0 fL 11.4       Assessment and Plan:     Status post left knee replacement    Primary localized osteoarthritis of left knee    Hypothyroidism      Plan  1. PT/OT- WBAT LLE  2. Pain control-prns, AC nerve block   3. IS-encourage  4. DVT proph- Mechs/ASA  5. Bowel regimen  6. Resume home medications as appropriate  7. Monitor post-op labs  8. DC planning for home    Hypothyroid  -Continue home Synthroid      STEPHAN Vee  05/09/24  16:09 EDT

## 2024-05-09 NOTE — PLAN OF CARE
Goal Outcome Evaluation:  Plan of Care Reviewed With: patient, sibling        Progress: improving  Outcome Evaluation: Pt amb 100' with FWW and CGAx2. pt navigated 10 steps with SPC and R HR. Decreased weight shifting onto LLE noted with stair training. Improved with VC. Activity limited by fatigue. No reports of nausea noted. Recommend d/c home with assist and OPPT when medically appropriate. Pt cleared to d/c today from PT standpoint.      Anticipated Discharge Disposition (PT): home with assist, home with outpatient therapy services

## 2024-05-09 NOTE — OP NOTE
Preoperative diagnosis:Left knee end stage osteoarthritis    Postoperative diagnosis: Left knee end stage osteoarthritis    Operative procedure: Left total knee arthroplasty with Cori robot computer guidance    Surgeon: Dr. Sandeep German    Assistant: CAROLIN Duarte.  Necessary during the case for assistance with positioning of the patient, exposure, implantation of components and closure.  Necessary for the success and completion of the case.    Anesthesia: Spinal with local and adductor canal catheter    Estimated blood loss: Minimal    Surgical Approach: Knee Medial Parapatellar     Implants: [employer] posterior stabilized total knee arthroplasty size 5 narrow femur, 3 tibia, 29 patella, 9 polyethylene.    Tourniquet time: 74 minutes at 300 mmHg    Reasoning for added complexity modifier: Please note a 22 modifier will be added to this case due to the patient's BMI of 39 which added significant time and complexity to the case in order to gain adequate exposure to perform the total knee replacement.    Indications for procedure: Monique is a very pleasant 64-year-old female who has struggled with end-stage activity limiting left knee pain secondary to osteoarthritis.  X-rays in September revealed severe tricompartmental degenerative changes in a varus knee with complete loss of medial joint space and marginal osteophyte formation.  They have failed exhaustive conservative treatment measures including cortisone injections, viscosupplementation injections years ago and physical therapy.  Right knee doing well status post right knee replacement in 2021.  She takes Advil for her knee pain.  After undergoing a shared decision-making process they agreed to proceed with left total knee arthroplasty.  Surgical consent form was signed.    Description of procedure: The patient was seen in the preoperative holding area and the left knee was signed to confirm the correct operative site.  They were seen by anesthesia.  They  received Ancef 2 g IV prophylactic antibiotics within 1 hour of incision time.  They were brought back to the operating room.  Spinal was performed without difficulty and they were given sedation throughout the case.  Patient placed in the supine position and all bony prominences were well-padded.  Nonsterile tourniquet was applied to the thigh.  The left lower extremity was prepped and draped in the usual sterile fashion and timeout was performed to confirm left total knee arthroplasty for patient Monique Tierney.    The left lower extremity was exsanguinated with an Esmarch.  Tourniquet was inflated to 300 mmHg.  With the knee flexed a midline incision was made with a 10 blade scalpel.  Adequate hemostasis maintained with Bovie electrocautery.  Full-thickness medial and lateral flaps were elevated.  Using a fresh 10 blade scalpel I made a medial parapatellar arthrotomy.  The patella was everted.  Patella fat pad and anterior femoral fat pads were excised.  Marginal osteophytes were removed.  Medial release was performed for this varus knee using Bovie electrocautery.  Z retractors were placed medially and laterally.    Guide pins for the CORI were placed in the medial tibia shaft and distal medial femur. Sensors were placed and data capture was performed per standard CORI computer guidance technique.  Femoral and tibial sizes were determined.    The distal femoral cut was then made with a tonja at the previously selected depth and amount of valgus (3-5 degrees). The 4-in-1 cutting guide for the previously selected femur size of 5 was pinned in place at the appropriate amount of external rotation.  Anterior and posterior cuts were made as were the chamfer cuts.  Cut bone was removed.  We then turned our attention to the tibia.    The tibia was subluxed anteriorly. PCL retractor was placed as were medial and lateral Hohmann retractors.  The tibial cutting guide was then pinned in place using CORI guidance for the  proximal tibial cut. We then used the oscillating saw to make the proximal tibial cut and this cut was then checked with the CORI. Medial and lateral menisci were then excised as were posterior osteophytes.    Flexion and extension gaps were then checked and with a 9 mm block we achieved full extension and flexion with excellent alignment. The tibia was sized to a 3 tibial tray centered off the medial third of the tibial tubercle.  The tray was pinned in place.  We then impacted the tibial fins.  Size 5  trial femur was then placed and box cut was made with the reamer and punch. We trialed with a size 9 polyethylene, 5 femur and 3 tibia.  With these trial components in place we achieved full extension and flexion with excellent alignment and stable throughout.     We then turned our attention to the patella.  Patella was sized to 22 mm in thickness and we made a 9 mm patellar cut with the reciprocal saw.  Patella drill holes were made with the appropriate size guide.  A 29 trial button was placed and there was excellent tracking with the no thumbs technique.    Trial components were then removed.  Final components were opened on the back table.  Posterior capsule was injected with 20 mL of half percent ropivacaine and 5 mg of morphine.  Cement was mixed on the back table.  The knee was copiously irrigated with Pulsavac lavage.  The knee was thoroughly dried.  Cement was then applied to the tibia and final size 3 tibial tray was impacted in place and excess cement was removed.  Cement was applied to the femur and final size 5 narrow femur was impacted in place and excess cement removed.  We then placed a 9 mm polyethylene-this was seen to be fully seated in the tibial tray.  Knee was then placed in extension and we applied cement to the cut patellar surface and 29 patella was held in place with patellar clamp.  All excess cement was removed.  The knee was left in extension while cement cured.  While the cement cured  we removed the CORI tibial and femoral pins and sensors.  Tibial pin incisions were closed with 3-0 monocryl.    Once the cement was fully cured we irrigated the knee with diluted Betadine solution and Pulsavac lavage.  The knee was taken through full range of motion and seen to achieve full extension and flexion with excellent patellar tracking.    We then proceeded with closure.  The deep fascia was closed with a #1 Stratafix barbed suture.  Dermis was closed with 2-0 Vicryl.  Skin was closed with a running 3-0 Stratafix barbed subcuticular suture.    A sterile dressing was then applied with mesh dressing and glue.  We then applied 4x4's, ABDs, soft roll and a six-inch Ace bandage.    Tourniquet was deflated at 74 minutes.  Patient was transferred to recovery in stable condition.  All sponge and needle counts were correct ×2.  Patient received first dose of tranexamic acid just prior to incision and the second dose 5-10 minutes prior to letting down the tourniquet to minimize bleeding.    Postoperative plan:  Patient will be admitted to Dr. PEÑA for medical management  Mobilize starting today with PT/OT as tolerated  Start aspirin for DVT prophylaxis tomorrow   consult for home physical therapy and home equipment needs  Follow-up with me in 2-3 weeks.    Fernando German MD  05/09/24  09:31 EDT      CC: Dr. Georgina De La Cruz

## 2024-05-09 NOTE — PLAN OF CARE
Goal Outcome Evaluation:  Plan of Care Reviewed With: patient, sibling        Progress: no change  Outcome Evaluation: Pt amb 80' with FWW and CGAx2. No knee buckling or LOB observed. L knee hyperextension noted. Activity limited by nausea. HEP and precautions reviewed. Frequent ambulation with nsg encouraged. Recommend d/c home with assist and OPPT when medically appropriate. Plan to assess stairs prior to d/c.      Anticipated Discharge Disposition (PT): home with assist, home with outpatient therapy services

## 2024-05-09 NOTE — DISCHARGE INSTRUCTIONS
InfuBLOCK - Patient Information    What is a pain pump?  The InfuBLOCK pump delivers post-operative, non-narcotic, numbing medication to the nerve near the surgical site for pain relief.     Where can I find information about my pain pump?           For more information about your pain pump, scan the QR code.  For additional patient resources, visit [email or web address].                                                                                               While your physician is your primary source for information about your treatment there may be times during your treatment that you need assistance with your infusion pump.     If you need assistance take the following steps:    The [facility] Nursing Hotline is Here for You 24/7.  Please call [phone] for the following concerns or complications:    Answers to questions about your infusion pump                 Tubing disconnect  Assistance with pump alarms                                                      Dislodged catheter  Excessive leakage noted from pump                                         Inadequate pain control    2.   [facility] Anesthesia Acute Pain Service: [phone] is available 24/7 for any further needs or concerns about medication or pain control.     -------------------------------------------------------------------------    Nerve Catheter Removal Instructions  When your device is empty:    Remove your catheter by pulling the dressing off slowly (like you would remove a regular bandage). The catheter should pull right out of the skin.  Check that the BLUE tip is intact.                                                                                     If the catheter is stuck, reposition your   extremity and pull slowly until removed.  *If catheter is HURTING and WON'T come out, stop and call [phone] for further assistance.    Remove medication bag from the black carrying case.  Cut the  tubing on right and left side of pump, and discard the medication bag and tubing into garbage.  Place the pump and black carrying case into the plastic bag and then place this into the return box.  Seal box with blue stickers and return to [employer] postal service. THIS IS PRE-PAID POSTAGE.        -------------------------------------------------------------------------    [facility] COLD THERAPY - PATIENT INSTRUCTION SHEET    Cold Compression Therapy for your comfort and rehabilitation  Your caregivers want you to be productive in your rehab and comfortable during your stay. In keeping with those goals, you will be receiving an SMI Cold Therapy Wrap to help ease post-operative pain and swelling that might keep you from getting back on track! Your SMI Cold Therapy Wrap is effective and simple-to-use, and you will be encouraged to apply it throughout your hospital stay and at home through the duration of your recovery.    When you are ready to go home  Be sure to take your SMI Cold Therapy Wrap and both sets of Gel Bags with you for continued comfort and use throughout your rehabilitation. If you don't already have them, ask your nurse or aide to retrieve your SMI Gel Bags from the patient freezer.    Home use precautions  Always follow your medical professional's application instructions upon discharge. Your SMI Cold Therapy Wrap and Gel Bags are designed to last for months following your surgery. Never heat the Gel Bags unless specified by your healthcare provider. Supervision is advised when using this product on children or geriatric patients. To avoid danger of suffocation, please keep the outer plastic packaging away from children & pets.    Cold Therapy Instructions  Place Gel Bags in a freezer set ¾ of the way to max temperature for at least (4) hours. For best results, lay the Gel Bags flat and [id number] in the freezer. Once frozen, slide Gel Bags into the gel pouch and secure your wrap to the affected area with the  straps.  Gel wraps that have been stored in a freezer for an extended period of time may require a (10) minute period of softening up in a room temperature environment before application.  The gel pouch acts as a protective barrier. NEVER place frozen bags directly onto skin, as this may cause frostbite injury.  The [facility] Cold Therapy Wrap is designed to be able to be worm while ambulating. The compression straps can be secured well enough so that the Wrap won't fall off while moving.  Wrap Application Videos can be viewed at [employer].  An additional protective barrier such as clothing, a washcloth, hand-towel or pillowcase may be used during prolonged treatment applications.  The Gel-Pouch and Wrap are both Latex-Free and the Gel Bag ingredients are non toxic.    [facility] Wrap care instructions  The [facility] Cold Therapy Wrap may be hand washed and hung to dry when needed.    [facility] re-order information  Additional [facility] body specific wraps and/or Gel Bags can be re-ordered from [employer] or call [employer]WR[employer] ([phone])

## 2024-05-09 NOTE — BRIEF OP NOTE
TOTAL KNEE ARTHROPLASTY WITH CORI ROBOT  Progress Note    Monique Tierney  5/9/2024    Pre-op Diagnosis:   Left knee osteoarthritis       Post-Op Diagnosis Codes:     * Primary localized osteoarthritis of left knee [M17.12]    Procedure/CPT® Codes:  VT ARTHRP KNE CONDYLE&PLATU MEDIAL&LAT COMPARTMENTS [29188]  VT CPTR-ASST SURGICAL NAVIGATION IMAGE-LESS [10867]      Procedure(s):  TOTAL KNEE ARTHROPLASTY WITH CORI ROBOT - LEFT    Surgical Approach: Knee Medial Parapatellar            Surgeon(s):  Fernando German MD    Assistant: CAROLIN Duarte    Anesthesia: Spinal with local and adductor canal catheter    Staff:   Circulator: Raisa Ortiz RN  Scrub Person: Vel Vance  Vendor Representative: Perfecto Forbes ([employer])  Nursing Assistant: Edelmira Joyce  Assistant: Luis Alfredo Grove RNFA  Assistant: Luis Alfredo Grove RNFA      Estimated Blood Loss: minimal    Urine Voided: * No values recorded between 5/9/2024  7:30 AM and 5/9/2024  9:15 AM *    Specimens:                None          Drains: * No LDAs found *    Findings: Left knee severe tricompartmental degenerative changes with varus deformity        Complications: None    Implants: [employer]hew posterior stabilized total knee arthroplasty with a size 5 narrow femur, 3 tibia, 9 polyethylene and 29 patella    Tourniquet time: 74 minutes at 300 mmHg    Assistant: Luis Alfredo Grove RNFA  was responsible for performing the following activities: Retraction, assistance with implantation of components and closure and their skilled assistance was necessary for the success of this case.    Fernando German MD     Date: 5/9/2024  Time: 09:30 EDT

## 2024-06-17 ENCOUNTER — TRANSCRIBE ORDERS (OUTPATIENT)
Dept: PHYSICAL THERAPY | Facility: CLINIC | Age: 64
End: 2024-06-17
Payer: MEDICAID

## 2024-06-17 DIAGNOSIS — Z96.652 STATUS POST TOTAL LEFT KNEE REPLACEMENT: Primary | ICD-10-CM

## 2024-06-19 ENCOUNTER — TREATMENT (OUTPATIENT)
Dept: PHYSICAL THERAPY | Facility: CLINIC | Age: 64
End: 2024-06-19
Payer: MEDICAID

## 2024-06-19 DIAGNOSIS — M25.562 CHRONIC KNEE PAIN AFTER TOTAL REPLACEMENT OF LEFT KNEE JOINT: Primary | ICD-10-CM

## 2024-06-19 DIAGNOSIS — G89.29 CHRONIC KNEE PAIN AFTER TOTAL REPLACEMENT OF LEFT KNEE JOINT: Primary | ICD-10-CM

## 2024-06-19 DIAGNOSIS — Z96.652 CHRONIC KNEE PAIN AFTER TOTAL REPLACEMENT OF LEFT KNEE JOINT: Primary | ICD-10-CM

## 2024-06-19 NOTE — PROGRESS NOTES
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 Pt has been here before for her R knee and had good success. Pt reports that she takes care of her mom at home. Pt reports that she has a lot of trouble bending the knee. Pt reports that she develops a lot of scar tissue after surgeries and feels very tight at the proximal end of the incision. Pt reports that she has trouble with stairs at the house.       Patient Occupation: Retired Pain  Current pain rating: 3  Relieving factors: medications  Aggravating factors: stairs, ambulation and squatting  Progression: improved    Social Support  Lives in: multiple-level home  Lives with: parents    Diagnostic Tests  X-ray: normal (L TKA)    Treatments  Previous treatment: medication  Current treatment: medication  Patient Goals  Patient goals for therapy: decreased pain, increased motion and increased strength           Objective          Passive Range of Motion   Left Knee   Flexion: 71 degrees     Right Knee   Flexion: 117 degrees     Additional Passive Range of Motion Details  Pt with 87 degrees of L knee flexion following mobilization today.     Ambulation     Observational Gait   Gait: antalgic and asymmetric     Quality of Movement During Gait     Knee    Knee (Left): Positive stiff knee.           Assessment & Plan       Assessment  Impairments: abnormal gait, abnormal muscle firing, abnormal or restricted ROM, activity intolerance, impaired  balance, impaired physical strength, lacks appropriate home exercise program, pain with function and weight-bearing intolerance   Functional limitations: walking, uncomfortable because of pain, sitting, standing and stooping   Assessment details: Patient is a 64 year old female who comes to physical therapy following L TKA. Signs and symptoms are consistent with post surgical stiffness and swelling resulting in pain, decreased ROM, decreased strength, and inability to perform all essential functional activities. Pt will benefit from skilled PT services to address the above issues.       Prognosis: good    Goals  Plan Goals: SHORT TERM GOALS:  2 weeks       1. Pt independent with HEP  2. Pt to demonstrate eliseo hip strength 4/5 or greater to improve stability with ambulation  3. Pt to report being able to walk for 10 minutes without increasing pain in the left knee    LONG TERM GOALS:   6 weeks  1. Pt to demonstrate ability to perform full functional squat with good form and control of the knees and without increasing pain  2. Pt to demonstrate eliseo hip strength to 4+/5 or greater to improve safety with ambulation on uneven surfaces  3. Pt will present to PT with full extension and 120 degrees of L knee flexion.   4. Pt to demonstrate ability to perform step up/down 8 inch step x10 safely and without pain in the left knee         Plan  Therapy options: will be seen for skilled therapy services  Planned modality interventions: cryotherapy  Planned therapy interventions: balance/weight-bearing training, body mechanics training, flexibility, functional ROM exercises, gait training, home exercise program, joint mobilization, manual therapy, motor coordination training, neuromuscular re-education, soft tissue mobilization, strengthening, stretching and therapeutic activities  Frequency: 2x week  Duration in weeks: 6  Treatment plan discussed with: patient        Timed Treatment:  Manual Therapy:         mins   69987;  Therapeutic Exercise:         mins  00537;     Neuromuscular Black:        mins  49781;    Therapeutic Activity:          mins  59296;     Gait Training:           mins  33533;     Ultrasound:          mins  57062;    Electrical Stimulation:         mins  28155 ( );  Dry Needling          mins self-pay  Iontophoresis          mins 19876    Untimed Treatments:  Electrical Stimulation:         mins  79339 ( );  Dry Needling:                     mins  Ultrasound:                         mins  35210;      Timed Treatment:      mins   Total Treatment:      38  mins    PT SIGNATURE: ANNIE Del Rosario License: [id number]  DATE TREATMENT INITIATED: 6/19/2024    Initial Certification  Certification Period: 9/16/2024  I certify that the therapy services are furnished while this patient is under my care.  The services outlined above are required by this patient, and will be reviewed every 90 days.     PHYSICIAN: Fernando German MD      DATE:     Please sign and return via fax to [phone].. Thank you, [facility].

## 2024-06-20 ENCOUNTER — TREATMENT (OUTPATIENT)
Dept: PHYSICAL THERAPY | Facility: CLINIC | Age: 64
End: 2024-06-20
Payer: MEDICAID

## 2024-06-20 DIAGNOSIS — G89.29 CHRONIC KNEE PAIN AFTER TOTAL REPLACEMENT OF LEFT KNEE JOINT: Primary | ICD-10-CM

## 2024-06-20 DIAGNOSIS — Z96.652 CHRONIC KNEE PAIN AFTER TOTAL REPLACEMENT OF LEFT KNEE JOINT: Primary | ICD-10-CM

## 2024-06-20 DIAGNOSIS — M25.562 CHRONIC KNEE PAIN AFTER TOTAL REPLACEMENT OF LEFT KNEE JOINT: Primary | ICD-10-CM

## 2024-06-21 NOTE — PROGRESS NOTES
Physical Therapy Daily Treatment Note      Visit #: 2    Monique Tierney reports that she was sore after her visit yesterday.         Objective Pt present to PT today with no distress at rest.     Pt with 91 degrees of L knee flexion today with full extension.       See Exercise, Manual, and Modality Logs for complete treatment.     Assessment/Plan  Pt continues to show improvement in L knee flexion and will continue to work on mobility to avoid manipulation.       Progress per Plan of Care      Visit Diagnosis:    ICD-10-CM ICD-9-CM   1. 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  M25.562 719.46    G89.29 V43.65    Z96.652               Timed Treatment:  Manual Therapy:    17     mins  39436;  Therapeutic Exercise:    12     mins  97581;     Neuromuscular Black:    10    mins  13226;    Therapeutic Activity:          mins  50479;     Gait Training:           mins  47662;     Ultrasound:          mins  55649;    Electrical Stimulation:         mins  31835 ( );  Dry Needling          mins self-pay  Iontophoresis          mins 05893    Untimed Treatments:  Electrical Stimulation:         mins  42995 ( );  Dry Needling:                     mins  Ultrasound:                         mins  98715;        Timed Treatment:   39   mins   Total Treatment:     39   mins    Gabe Boyd, PT  Physical Therapist

## 2024-06-25 ENCOUNTER — TREATMENT (OUTPATIENT)
Dept: PHYSICAL THERAPY | Facility: CLINIC | Age: 64
End: 2024-06-25
Payer: MEDICAID

## 2024-06-25 DIAGNOSIS — M25.562 CHRONIC KNEE PAIN AFTER TOTAL REPLACEMENT OF LEFT KNEE JOINT: Primary | ICD-10-CM

## 2024-06-25 DIAGNOSIS — G89.29 CHRONIC KNEE PAIN AFTER TOTAL REPLACEMENT OF LEFT KNEE JOINT: Primary | ICD-10-CM

## 2024-06-25 DIAGNOSIS — Z96.652 CHRONIC KNEE PAIN AFTER TOTAL REPLACEMENT OF LEFT KNEE JOINT: Primary | ICD-10-CM

## 2024-06-26 ENCOUNTER — TREATMENT (OUTPATIENT)
Dept: PHYSICAL THERAPY | Facility: CLINIC | Age: 64
End: 2024-06-26
Payer: MEDICAID

## 2024-06-26 DIAGNOSIS — Z96.652 CHRONIC KNEE PAIN AFTER TOTAL REPLACEMENT OF LEFT KNEE JOINT: Primary | ICD-10-CM

## 2024-06-26 DIAGNOSIS — G89.29 CHRONIC KNEE PAIN AFTER TOTAL REPLACEMENT OF LEFT KNEE JOINT: Primary | ICD-10-CM

## 2024-06-26 DIAGNOSIS — M25.562 CHRONIC KNEE PAIN AFTER TOTAL REPLACEMENT OF LEFT KNEE JOINT: Primary | ICD-10-CM

## 2024-06-26 NOTE — PROGRESS NOTES
Physical Therapy Daily Treatment Note      Visit #: 3    Monique Tierney reports that she is very sore today and the worst is in the quad on the L side. Pt reports that she has been working the knee outside of PT to help keep it mobile.         Objective Pt present to PT today with no distress at rest.     Pt with 109 degrees of L knee flexion today with mobilization and stretching with PT.     Pt did well with other exercises for hip and knee strengthening.       See Exercise, Manual, and Modality Logs for complete treatment.     Assessment/Plan  Pt continues to have improved L knee mobility following manipulation and will continue with PT each day this week to continue working on mobility and knee function.       Progress per Plan of Care      Visit Diagnosis:    ICD-10-CM ICD-9-CM   1. 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  M25.562 719.46    G89.29 V43.65    Z96.652               Timed Treatment:  Manual Therapy:    25     mins  61630;  Therapeutic Exercise:    13     mins  52343;     Neuromuscular Black:        mins  30509;    Therapeutic Activity:          mins  68375;     Gait Training:           mins  35022;     Ultrasound:          mins  51827;    Electrical Stimulation:         mins  21549 ( );  Dry Needling          mins self-pay  Iontophoresis          mins 04335    Untimed Treatments:  Electrical Stimulation:         mins  99370 (MC );  Dry Needling:                     mins  Ultrasound:                         mins  74819;        Timed Treatment:    38  mins   Total Treatment:     51   mins    Gabe Boyd, PT  Physical Therapist

## 2024-06-26 NOTE — PROGRESS NOTES
Physical Therapy Daily Treatment Note      Visit #: 3    Monique Tierney reports having just come from a manipulation of the L TKA. Pt states that her knee is pretty numb still from the procedure. Pt reports that she is getting a lot of soreness in the L quad right now.         Objective Pt present to PT today with no distress at rest.     Pt ambulating with a straight cane right now with her feeling being off in the L knee.     Pt with 97 degrees of L knee flexion with activities today.     LEFS: 41    SHORT TERM GOALS:  2 weeks                        1. Pt independent with HEP  2. Pt to demonstrate eliseo hip strength 4/5 or greater to improve stability with ambulation  3. Pt to report being able to walk for 10 minutes without increasing pain in the left knee     LONG TERM GOALS:   6 weeks  1. Pt to demonstrate ability to perform full functional squat with good form and control of the knees and without increasing pain  2. Pt to demonstrate eliseo hip strength to 4+/5 or greater to improve safety with ambulation on uneven surfaces  3. Pt will present to PT with full extension and 120 degrees of L knee flexion.   4. Pt to demonstrate ability to perform step up/down 8 inch step x10 safely and without pain in the left knee       See Exercise, Manual, and Modality Logs for complete treatment.     Assessment/Plan  Pt did well with activities today in the clinic and will continue to push the mobility after her manipulation. Pt to follow up tomorrow and continue as tolerated.       Progress per Plan of Care      Visit Diagnosis:    ICD-10-CM ICD-9-CM   1. 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  M25.562 719.46    G89.29 V43.65    Z96.652               Timed Treatment:  Manual Therapy:    25     mins  04930;  Therapeutic Exercise:    14     mins  40638;     Neuromuscular Black:        mins  70567;    Therapeutic Activity:          mins  80578;     Gait Training:           mins  88263;     Ultrasound:           mins  98218;    Electrical Stimulation:         mins  81824 ( );  Dry Needling          mins self-pay  Iontophoresis          mins 71250    Untimed Treatments:  Electrical Stimulation:         mins  74517 ( );  Dry Needling:                     mins  Ultrasound:                         mins  13923;        Timed Treatment:   39   mins   Total Treatment:     39   mins    Gabe Boyd, PT  Physical Therapist

## 2024-06-27 ENCOUNTER — TREATMENT (OUTPATIENT)
Dept: PHYSICAL THERAPY | Facility: CLINIC | Age: 64
End: 2024-06-27
Payer: MEDICAID

## 2024-06-27 DIAGNOSIS — G89.29 CHRONIC KNEE PAIN AFTER TOTAL REPLACEMENT OF LEFT KNEE JOINT: Primary | ICD-10-CM

## 2024-06-27 DIAGNOSIS — Z96.652 CHRONIC KNEE PAIN AFTER TOTAL REPLACEMENT OF LEFT KNEE JOINT: Primary | ICD-10-CM

## 2024-06-27 DIAGNOSIS — M25.562 CHRONIC KNEE PAIN AFTER TOTAL REPLACEMENT OF LEFT KNEE JOINT: Primary | ICD-10-CM

## 2024-06-28 ENCOUNTER — TREATMENT (OUTPATIENT)
Dept: PHYSICAL THERAPY | Facility: CLINIC | Age: 64
End: 2024-06-28
Payer: MEDICAID

## 2024-06-28 DIAGNOSIS — G89.29 CHRONIC KNEE PAIN AFTER TOTAL REPLACEMENT OF LEFT KNEE JOINT: Primary | ICD-10-CM

## 2024-06-28 DIAGNOSIS — Z96.652 CHRONIC KNEE PAIN AFTER TOTAL REPLACEMENT OF LEFT KNEE JOINT: Primary | ICD-10-CM

## 2024-06-28 DIAGNOSIS — M25.562 CHRONIC KNEE PAIN AFTER TOTAL REPLACEMENT OF LEFT KNEE JOINT: Primary | ICD-10-CM

## 2024-06-28 NOTE — PROGRESS NOTES
Physical Therapy Daily Treatment Note      Visit #: 5    Monique Tierney reports that her L knee is not hurting and the banded feeling that was present in the knee prior to her manipulation is gone. Pt states that despite this, her L quad is really bothering her and is keeping her knee from moving well.         Objective Pt present to PT today with no distress at rest.     Pt with tenderness through the L medial quadriceps.     Pt with 94 degrees of L knee flexion today with pain in quad limiting mobility.       See Exercise, Manual, and Modality Logs for complete treatment.     Assessment/Plan  Pt to follow up tomorrow to assess STM on the quad and continue with mobility activities for the L knee.       Progress per Plan of Care      Visit Diagnosis:    ICD-10-CM ICD-9-CM   1. 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  M25.562 719.46    G89.29 V43.65    Z96.652               Timed Treatment:  Manual Therapy:    24     mins  51368;  Therapeutic Exercise:    20     mins  03375;     Neuromuscular Black:        mins  40043;    Therapeutic Activity:          mins  09998;     Gait Training:           mins  15569;     Ultrasound:          mins  18415;    Electrical Stimulation:         mins  25277 ( );  Dry Needling          mins self-pay  Iontophoresis          mins 70009    Untimed Treatments:  Electrical Stimulation:         mins  21232 ( );  Dry Needling:                     mins  Ultrasound:                         mins  78811;        Timed Treatment:   44   mins   Total Treatment:     44   mins    Gabe Boyd, PT  Physical Therapist

## 2024-06-28 NOTE — PROGRESS NOTES
Physical Therapy Daily Treatment Note      Visit #: 6    Monique Tierney reports 4-5/10 pain today when engaging the quad although no pain at rest.         Objective Pt present to PT today with no distress at rest.     Pt with 96 degrees of L knee flexion today with stretching.     Pt with soreness in the L quad throughout session.     PT and pt worked on gait pattern to improve knee flexion and toe off throughout gait on the L side.       See Exercise, Manual, and Modality Logs for complete treatment.     Assessment/Plan  Pt continues to have a lot of soreness in the L quad although is having less tightness in the knee or pain in the knee. Pt to continue with PT to help improve L knee mobility and function.       Progress per Plan of Care      Visit Diagnosis:    ICD-10-CM ICD-9-CM   1. 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  M25.562 719.46    G89.29 V43.65    Z96.652               Timed Treatment:  Manual Therapy:    17     mins  31852;  Therapeutic Exercise:    20     mins  10082;     Neuromuscular Black:    10    mins  84411;    Therapeutic Activity:          mins  36800;     Gait Training:           mins  82229;     Ultrasound:          mins  29020;    Electrical Stimulation:         mins  72377 ( );  Dry Needling          mins self-pay  Iontophoresis          mins 10593    Untimed Treatments:  Electrical Stimulation:         mins  34406 ( );  Dry Needling:                     mins  Ultrasound:                         mins  17318;        Timed Treatment:   47   mins   Total Treatment:     47   mins    Gabe Boyd, PT  Physical Therapist

## 2024-07-02 ENCOUNTER — TREATMENT (OUTPATIENT)
Dept: PHYSICAL THERAPY | Facility: CLINIC | Age: 64
End: 2024-07-02
Payer: MEDICAID

## 2024-07-02 DIAGNOSIS — G89.29 CHRONIC KNEE PAIN AFTER TOTAL REPLACEMENT OF LEFT KNEE JOINT: Primary | ICD-10-CM

## 2024-07-02 DIAGNOSIS — Z96.652 CHRONIC KNEE PAIN AFTER TOTAL REPLACEMENT OF LEFT KNEE JOINT: Primary | ICD-10-CM

## 2024-07-02 DIAGNOSIS — M25.562 CHRONIC KNEE PAIN AFTER TOTAL REPLACEMENT OF LEFT KNEE JOINT: Primary | ICD-10-CM

## 2024-07-02 NOTE — PROGRESS NOTES
Physical Therapy Daily Treatment Note      Visit #: 7    Monique Tierney reports 3/10 pain today at rest.  Pt reports that she called the surgeon's office and is waiting for a call back because she continues to have pain in the L quad. Pt reports that the pain has moved down some into the lower, medial quad. Pt reports that she is trying to walk with a more normalized gait as worked on during last session.         Objective Pt present to PT today with no distress at rest.     Pt with 96 degrees of L knee flexion today.     Pt very tender through the medial quad on the L LE.       See Exercise, Manual, and Modality Logs for complete treatment.     Assessment/Plan  Pt continues to have issues in the L quad following manipulation which is limiting her function and mobility in the L knee. Pt to continue with PT next visit as tolerated.       Progress per Plan of Care       Visit Diagnosis:    ICD-10-CM ICD-9-CM   1. 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  M25.562 719.46    G89.29 V43.65    Z96.652               Timed Treatment:  Manual Therapy:    25     mins  12211;  Therapeutic Exercise:    30     mins  54349;     Neuromuscular Black:        mins  74702;    Therapeutic Activity:          mins  37674;     Gait Training:           mins  41680;     Ultrasound:          mins  00973;    Electrical Stimulation:         mins  35520 ( );  Dry Needling          mins self-pay  Iontophoresis          mins 11672    Untimed Treatments:  Electrical Stimulation:         mins  84993 ( );  Dry Needling:                     mins  Ultrasound:                         mins  66664;        Timed Treatment:   55   mins   Total Treatment:     55   mins    Gabe Boyd, PT  Physical Therapist

## 2024-07-16 ENCOUNTER — TREATMENT (OUTPATIENT)
Dept: PHYSICAL THERAPY | Facility: CLINIC | Age: 64
End: 2024-07-16
Payer: MEDICAID

## 2024-07-16 DIAGNOSIS — Z96.652 CHRONIC KNEE PAIN AFTER TOTAL REPLACEMENT OF LEFT KNEE JOINT: Primary | ICD-10-CM

## 2024-07-16 DIAGNOSIS — M25.562 CHRONIC KNEE PAIN AFTER TOTAL REPLACEMENT OF LEFT KNEE JOINT: Primary | ICD-10-CM

## 2024-07-16 DIAGNOSIS — G89.29 CHRONIC KNEE PAIN AFTER TOTAL REPLACEMENT OF LEFT KNEE JOINT: Primary | ICD-10-CM

## 2024-07-16 NOTE — PROGRESS NOTES
Physical Therapy Daily Treatment Note      Visit #: 8    Monique Tierney reports 0/10 pain today at rest.  Pt reports that she has been in a dynasplint several hours a day for the last couple weeks. Pt reports that her L quad is feeling much better and that her ROM has improved.         Objective Pt present to PT today with no distress at rest.     Pt with 110 degrees of L knee flexion today.       See Exercise, Manual, and Modality Logs for complete treatment.     Assessment/Plan  Pt has no pain in the L quad today with activities and will continue with progression of activities in the clinic to return to pain free mobility and function with daily activities.       Progress per Plan of Care      Visit Diagnosis:    ICD-10-CM ICD-9-CM   1. 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  M25.562 719.46    G89.29 V43.65    Z96.652               Timed Treatment:  Manual Therapy:    16     mins  39203;  Therapeutic Exercise:    12     mins  97262;     Neuromuscular Black:    10    mins  85468;    Therapeutic Activity:          mins  27173;     Gait Training:           mins  93597;     Ultrasound:          mins  14195;    Electrical Stimulation:         mins  15397 ( );  Dry Needling          mins self-pay  Iontophoresis          mins 06300    Untimed Treatments:  Electrical Stimulation:         mins  36070 ( );  Dry Needling:                     mins  Ultrasound:                         mins  34237;        Timed Treatment:   38   mins   Total Treatment:     52   mins    Gabe Boyd, PT  Physical Therapist

## 2024-07-18 ENCOUNTER — TREATMENT (OUTPATIENT)
Dept: PHYSICAL THERAPY | Facility: CLINIC | Age: 64
End: 2024-07-18
Payer: MEDICAID

## 2024-07-18 DIAGNOSIS — Z96.652 CHRONIC KNEE PAIN AFTER TOTAL REPLACEMENT OF LEFT KNEE JOINT: Primary | ICD-10-CM

## 2024-07-18 DIAGNOSIS — M25.562 CHRONIC KNEE PAIN AFTER TOTAL REPLACEMENT OF LEFT KNEE JOINT: Primary | ICD-10-CM

## 2024-07-18 DIAGNOSIS — G89.29 CHRONIC KNEE PAIN AFTER TOTAL REPLACEMENT OF LEFT KNEE JOINT: Primary | ICD-10-CM

## 2024-07-18 NOTE — PROGRESS NOTES
Physical Therapy Daily Treatment Note      Visit #: 9    Monique Tierney reports 0/10 pain today at rest.  Pt reports that she felt good after last session with no issues in the L knee.         Objective Pt present to PT today with no distress at rest.     Pt with notable improvement in the L knee with mobility with ambulation today.     Pt with improved L knee flexion to 112 degrees today.       See Exercise, Manual, and Modality Logs for complete treatment.     Assessment/Plan  Pt continues to do well with activities since her quad has been feeling better. Pt to continue with PT as tolerated to improve knee mobility, function, and strength with daily activities.       Progress per Plan of Care      Visit Diagnosis:    ICD-10-CM ICD-9-CM   1. 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  M25.562 719.46    G89.29 V43.65    Z96.652               Timed Treatment:  Manual Therapy:    13     mins  08109;  Therapeutic Exercise:    15     mins  59365;     Neuromuscular Black:    10    mins  36836;    Therapeutic Activity:          mins  43872;     Gait Training:           mins  95481;     Ultrasound:          mins  80896;    Electrical Stimulation:         mins  81390 ( );  Dry Needling          mins self-pay  Iontophoresis          mins 01995    Untimed Treatments:  Electrical Stimulation:         mins  30390 ( );  Dry Needling:                     mins  Ultrasound:                         mins  03120;        Timed Treatment:   38   mins   Total Treatment:     56   mins    Gabe Boyd, PT  Physical Therapist

## 2024-07-24 RX ORDER — OMEPRAZOLE 40 MG/1
40 CAPSULE, DELAYED RELEASE ORAL DAILY
Qty: 30 CAPSULE | Refills: 3 | Status: SHIPPED | OUTPATIENT
Start: 2024-07-24

## 2024-09-05 ENCOUNTER — LAB (OUTPATIENT)
Dept: LAB | Facility: HOSPITAL | Age: 64
End: 2024-09-05
Payer: COMMERCIAL

## 2024-09-05 ENCOUNTER — TRANSCRIBE ORDERS (OUTPATIENT)
Dept: LAB | Facility: HOSPITAL | Age: 64
End: 2024-09-05
Payer: COMMERCIAL

## 2024-09-05 DIAGNOSIS — Z86.39 PERSONAL HISTORY OF OTHER ENDOCRINE, NUTRITIONAL AND METABOLIC DISEASE: Primary | ICD-10-CM

## 2024-09-05 DIAGNOSIS — Z86.39 PERSONAL HISTORY OF OTHER ENDOCRINE, NUTRITIONAL AND METABOLIC DISEASE: ICD-10-CM

## 2024-09-05 LAB
ALBUMIN SERPL-MCNC: 4.2 G/DL (ref 3.5–5.2)
ALBUMIN/GLOB SERPL: 1.4 G/DL
ALP SERPL-CCNC: 100 U/L (ref 39–117)
ALT SERPL W P-5'-P-CCNC: 19 U/L (ref 1–33)
ANION GAP SERPL CALCULATED.3IONS-SCNC: 12 MMOL/L (ref 5–15)
AST SERPL-CCNC: 18 U/L (ref 1–32)
BASOPHILS # BLD AUTO: 0.06 10*3/MM3 (ref 0–0.2)
BASOPHILS NFR BLD AUTO: 1 % (ref 0–1.5)
BILIRUB SERPL-MCNC: 0.4 MG/DL (ref 0–1.2)
BUN SERPL-MCNC: 17 MG/DL (ref 8–23)
BUN/CREAT SERPL: 24.3 (ref 7–25)
CALCIUM SPEC-SCNC: 9.1 MG/DL (ref 8.6–10.5)
CHLORIDE SERPL-SCNC: 104 MMOL/L (ref 98–107)
CHOLEST SERPL-MCNC: 161 MG/DL (ref 0–200)
CO2 SERPL-SCNC: 23 MMOL/L (ref 22–29)
CREAT SERPL-MCNC: 0.7 MG/DL (ref 0.57–1)
DEPRECATED RDW RBC AUTO: 39.7 FL (ref 37–54)
EGFRCR SERPLBLD CKD-EPI 2021: 97.3 ML/MIN/1.73
EOSINOPHIL # BLD AUTO: 0.07 10*3/MM3 (ref 0–0.4)
EOSINOPHIL NFR BLD AUTO: 1.2 % (ref 0.3–6.2)
ERYTHROCYTE [DISTWIDTH] IN BLOOD BY AUTOMATED COUNT: 12.5 % (ref 12.3–15.4)
GLOBULIN UR ELPH-MCNC: 2.9 GM/DL
GLUCOSE SERPL-MCNC: 102 MG/DL (ref 65–99)
HBA1C MFR BLD: 5.7 % (ref 4.8–5.6)
HCT VFR BLD AUTO: 43.5 % (ref 34–46.6)
HDLC SERPL-MCNC: 40 MG/DL (ref 40–60)
HGB BLD-MCNC: 14.5 G/DL (ref 12–15.9)
IMM GRANULOCYTES # BLD AUTO: 0.02 10*3/MM3 (ref 0–0.05)
IMM GRANULOCYTES NFR BLD AUTO: 0.3 % (ref 0–0.5)
LDLC SERPL CALC-MCNC: 102 MG/DL (ref 0–100)
LDLC/HDLC SERPL: 2.5 {RATIO}
LYMPHOCYTES # BLD AUTO: 1.47 10*3/MM3 (ref 0.7–3.1)
LYMPHOCYTES NFR BLD AUTO: 25.4 % (ref 19.6–45.3)
MCH RBC QN AUTO: 29.2 PG (ref 26.6–33)
MCHC RBC AUTO-ENTMCNC: 33.3 G/DL (ref 31.5–35.7)
MCV RBC AUTO: 87.7 FL (ref 79–97)
MONOCYTES # BLD AUTO: 0.46 10*3/MM3 (ref 0.1–0.9)
MONOCYTES NFR BLD AUTO: 7.9 % (ref 5–12)
NEUTROPHILS NFR BLD AUTO: 3.71 10*3/MM3 (ref 1.7–7)
NEUTROPHILS NFR BLD AUTO: 64.2 % (ref 42.7–76)
NRBC BLD AUTO-RTO: 0 /100 WBC (ref 0–0.2)
PLATELET # BLD AUTO: 229 10*3/MM3 (ref 140–450)
PMV BLD AUTO: 11.4 FL (ref 6–12)
POTASSIUM SERPL-SCNC: 4.1 MMOL/L (ref 3.5–5.2)
PROT SERPL-MCNC: 7.1 G/DL (ref 6–8.5)
RBC # BLD AUTO: 4.96 10*6/MM3 (ref 3.77–5.28)
SODIUM SERPL-SCNC: 139 MMOL/L (ref 136–145)
TRIGL SERPL-MCNC: 105 MG/DL (ref 0–150)
TSH SERPL DL<=0.05 MIU/L-ACNC: 3.47 UIU/ML (ref 0.27–4.2)
VLDLC SERPL-MCNC: 19 MG/DL (ref 5–40)
WBC NRBC COR # BLD AUTO: 5.79 10*3/MM3 (ref 3.4–10.8)

## 2024-09-05 PROCEDURE — 83036 HEMOGLOBIN GLYCOSYLATED A1C: CPT | Performed by: STUDENT IN AN ORGANIZED HEALTH CARE EDUCATION/TRAINING PROGRAM

## 2024-09-05 PROCEDURE — 84443 ASSAY THYROID STIM HORMONE: CPT | Performed by: STUDENT IN AN ORGANIZED HEALTH CARE EDUCATION/TRAINING PROGRAM

## 2024-09-05 PROCEDURE — 80061 LIPID PANEL: CPT | Performed by: STUDENT IN AN ORGANIZED HEALTH CARE EDUCATION/TRAINING PROGRAM

## 2024-09-05 PROCEDURE — 80053 COMPREHEN METABOLIC PANEL: CPT | Performed by: STUDENT IN AN ORGANIZED HEALTH CARE EDUCATION/TRAINING PROGRAM

## 2024-09-05 PROCEDURE — 85025 COMPLETE CBC W/AUTO DIFF WBC: CPT | Performed by: STUDENT IN AN ORGANIZED HEALTH CARE EDUCATION/TRAINING PROGRAM

## 2024-10-11 NOTE — TELEPHONE ENCOUNTER
Attempted to contact [facility]; left detailed message with verbal orders for physical therapy   
Lillie with [facility] and centralized intake left  on referral line that patient is now ready to start physical therapy since her knee surgery.  They are needing to get orders for home health to go out to work with her. Call [phone] to give verbal or place order in epic.   
Bone and tissue quality are normal.  Joint spaces are well maintained.      Assessment:   1. Medial epicondylitis of elbow, right  -     Amb External Referral To Physical Therapy  2. Cubital tunnel syndrome, right       Plan:  I explained the diagnosis to the patient based on imaging studies and clinical exam findings.  We discussed conservative treatment and management of their condition without surgery for now.  We discussed a variety of treatment options which include medications and therapy.  The patient's questions have been answered to their satisfaction.  We discussed the use of an over-the-counter anti-inflammatory and the risk and benefits of such.  If there is no improvement in symptoms, an MRI may be needed to clarify the diagnosis in the future.   A prescription for physical therapy will be written today to improve motion, strength, and pain.  We discussed the use of dry needling as well.  The patient understands that it is their responsibility to contact and a schedule their appointment.  Therapy will be 2-3 times per week for the next 4-6 weeks.    Return if symptoms worsen or fail to improve.    Electronically signed by Eyal Crowley MD on 10/11/2024 at 11:33 AM.

## 2024-10-23 RX ORDER — OMEPRAZOLE 40 MG/1
40 CAPSULE, DELAYED RELEASE ORAL DAILY
Qty: 30 CAPSULE | Refills: 3 | Status: SHIPPED | OUTPATIENT
Start: 2024-10-23

## 2024-12-06 NOTE — PROGRESS NOTES
Patient: Monique Tierney    YOB: 1960    Date: 12/09/2024    Primary Care Provider: Lin Jara MD    Chief Complaint   Patient presents with    Skin Lesion     R breast        SUBJECTIVE:    History of present illness:  The patient is in the office today for evaluation and treatment of a lesion on the right breast.  Mammogram from last year was normal.  She states she has had it for one month and it has grown in size overtime. Complains of some itching but no pain.  Area is irregular, raised and itches.    The following portions of the patient's history were reviewed and updated as appropriate: allergies, current medications, past family history, past medical history, past social history, past surgical history and problem list.      Review of Systems   Constitutional:  Negative for chills, fever and unexpected weight change.   HENT:  Negative for hearing loss, trouble swallowing and voice change.    Eyes:  Negative for visual disturbance.   Respiratory:  Negative for apnea, cough, chest tightness, shortness of breath and wheezing.    Cardiovascular:  Negative for chest pain, palpitations and leg swelling.   Gastrointestinal:  Negative for abdominal distention, abdominal pain, anal bleeding, blood in stool, constipation, diarrhea, nausea, rectal pain and vomiting.   Endocrine: Negative for cold intolerance and heat intolerance.   Genitourinary:  Negative for difficulty urinating, dysuria and flank pain.   Musculoskeletal:  Negative for back pain and gait problem.   Skin:  Negative for color change, rash and wound.   Neurological:  Negative for dizziness, syncope, speech difficulty, weakness, light-headedness, numbness and headaches.   Hematological:  Negative for adenopathy. Does not bruise/bleed easily.   Psychiatric/Behavioral:  Negative for confusion. The patient is not nervous/anxious.          Allergies:  Allergies   Allergen Reactions    Garlic GI Intolerance    Oxycodone Itching and Rash        Medications:    Current Outpatient Medications:     acetaminophen (TYLENOL) 500 MG tablet, Take 2 tablets by mouth Every 8 (Eight) Hours., Disp: 42 tablet, Rfl: 0    levothyroxine (SYNTHROID, LEVOTHROID) 75 MCG tablet, Take 1 tablet by mouth Every Morning., Disp: , Rfl:     omeprazole (priLOSEC) 40 MG capsule, TAKE 1 CAPSULE BY MOUTH DAILY., Disp: 30 capsule, Rfl: 3    aspirin 81 MG EC tablet, Take 1 tablet by mouth Every 12 (Twelve) Hours. For 1 month  Indications: VTE Prophylaxis (Patient not taking: Reported on 12/9/2024), Disp: 60 tablet, Rfl: 0    celecoxib (CeleBREX) 200 MG capsule, Take 1 capsule by mouth Daily As Needed. (Patient not taking: Reported on 12/9/2024), Disp: , Rfl:     Desvenlafaxine Succinate ER 25 MG tablet sustained-release 24 hour, , Disp: , Rfl:     docusate sodium 100 MG capsule, Take 1 capsule by mouth 2 (Two) Times a Day As Needed for Constipation. (Patient not taking: Reported on 12/9/2024), Disp: 60 each, Rfl: 0    meloxicam (MOBIC) 15 MG tablet, Take 1 tablet by mouth Daily. (Patient not taking: Reported on 12/9/2024), Disp: 10 tablet, Rfl: 0    oxyCODONE (ROXICODONE) 5 MG immediate release tablet, Take 1 tablet by mouth Every 4 (Four) Hours As Needed for Moderate Pain. (Patient not taking: Reported on 12/9/2024), Disp: 30 tablet, Rfl: 0    ropivacaine (NAROPIN) 0.2 % infusion (INFUSYSTEM), 2 mg/hr by Peripheral Nerve route Continuous. (Patient not taking: Reported on 12/9/2024), Disp: , Rfl:     sertraline (ZOLOFT) 50 MG tablet, Take 1 tablet by mouth Daily. (Patient not taking: Reported on 12/9/2024), Disp: , Rfl:     History:  Past Medical History:   Diagnosis Date    Anxiety     Arthritis     Endometriosis     GERD (gastroesophageal reflux disease)     History of panic attacks     Hypothyroid     Palpitations     anxiety induced    PONV (postoperative nausea and vomiting)     Uterine leiomyoma        Past Surgical History:   Procedure Laterality Date    ENDOSCOPY N/A  "02/10/2023    Procedure: ESOPHAGOGASTRODUODENOSCOPY with biopsy;  Surgeon: Ezra Iniguez MD;  Location: [facility] ENDOSCOPY;  Service: Gastroenterology;  Laterality: N/A;    EXPLORATORY LAPAROTOMY  2023    Patient said she had several prior to hysterectomy    HYSTERECTOMY Bilateral     Total Hysterectomy    LAPAROSCOPIC CHOLECYSTECTOMY  03/15/2022    MYOMECTOMY      OOPHORECTOMY      TOTAL KNEE ARTHROPLASTY Right 2021    Procedure: TOTAL KNEE ARTHROPLASTY RIGHT;  Surgeon: Fernando German MD;  Location:  [facility];  Service: Orthopedics;  Laterality: Right;    TOTAL KNEE ARTHROPLASTY Left 2024    Procedure: TOTAL KNEE ARTHROPLASTY WITH CORI ROBOT - LEFT;  Surgeon: Fernando German MD;  Location:  [facility];  Service: Robotics - Ortho;  Laterality: Left;       Family History   Problem Relation Age of Onset    Arthritis Mother     Diabetes Mother     Hypertension Mother     Thyroid disease Mother     Hyperlipidemia Mother     Heart attack Father     Heart disease Father     Cancer Maternal Aunt     Breast cancer Neg Hx     Ovarian cancer Neg Hx        Social History     Tobacco Use    Smoking status: Former     Current packs/day: 0.00     Types: Cigarettes     Quit date:      Years since quittin.9    Smokeless tobacco: Never   Vaping Use    Vaping status: Never Used   Substance Use Topics    Alcohol use: No    Drug use: No        OBJECTIVE:    Vital Signs:   Vitals:    24 1306   BP: 122/68   Pulse: 77   Temp: 98.4 °F (36.9 °C)   SpO2: 97%   Weight: 104 kg (229 lb)   Height: 157.5 cm (62.01\")       Physical Exam:       Skin-2 cm ulcerated irregular mass right breast.  Involves the skin.    Results Review:   I reviewed the patient's new clinical results.    Review of Systems was reviewed and confirmed as accurate as documented by the MA.    ASSESSMENT/PLAN:    1. Skin neoplasm        Patient scheduled for excision of possible skin cancer right breast.  Risk of bleeding " infection recurrence discussed and patient agreeable.  She had no questions and wishes to proceed with surgery.          Electronically signed by Ezra Iniguez MD  12/09/24

## 2024-12-09 ENCOUNTER — OFFICE VISIT (OUTPATIENT)
Dept: SURGERY | Facility: CLINIC | Age: 64
End: 2024-12-09
Payer: COMMERCIAL

## 2024-12-09 VITALS
WEIGHT: 229 LBS | OXYGEN SATURATION: 97 % | HEIGHT: 62 IN | TEMPERATURE: 98.4 F | BODY MASS INDEX: 42.14 KG/M2 | DIASTOLIC BLOOD PRESSURE: 68 MMHG | SYSTOLIC BLOOD PRESSURE: 122 MMHG | HEART RATE: 77 BPM

## 2024-12-09 DIAGNOSIS — D49.2 SKIN NEOPLASM: Primary | ICD-10-CM

## 2024-12-09 PROCEDURE — 99213 OFFICE O/P EST LOW 20 MIN: CPT | Performed by: SURGERY

## 2024-12-09 PROCEDURE — 1160F RVW MEDS BY RX/DR IN RCRD: CPT | Performed by: SURGERY

## 2024-12-09 PROCEDURE — 1159F MED LIST DOCD IN RCRD: CPT | Performed by: SURGERY

## 2024-12-09 RX ORDER — DESVENLAFAXINE 25 MG/1
TABLET, EXTENDED RELEASE ORAL
COMMUNITY
Start: 2024-09-09

## 2024-12-09 RX ORDER — CELECOXIB 200 MG/1
1 CAPSULE ORAL DAILY PRN
COMMUNITY
Start: 2024-02-06

## 2024-12-20 ENCOUNTER — TELEPHONE (OUTPATIENT)
Dept: SURGERY | Facility: CLINIC | Age: 64
End: 2024-12-20

## 2024-12-20 NOTE — TELEPHONE ENCOUNTER
Monique called and said that the lesion she was getting excised today is gone and wants to know if she still needs to come in for her appointment today.  Please call at [phone].

## 2024-12-29 ENCOUNTER — APPOINTMENT (OUTPATIENT)
Dept: CT IMAGING | Facility: HOSPITAL | Age: 64
End: 2024-12-29
Payer: COMMERCIAL

## 2024-12-29 ENCOUNTER — HOSPITAL ENCOUNTER (EMERGENCY)
Facility: HOSPITAL | Age: 64
Discharge: HOME | End: 2024-12-29
Attending: STUDENT IN AN ORGANIZED HEALTH CARE EDUCATION/TRAINING PROGRAM | Admitting: STUDENT IN AN ORGANIZED HEALTH CARE EDUCATION/TRAINING PROGRAM
Payer: COMMERCIAL

## 2024-12-29 VITALS
HEART RATE: 80 BPM | SYSTOLIC BLOOD PRESSURE: 140 MMHG | HEIGHT: 62 IN | TEMPERATURE: 98.3 F | WEIGHT: 217.8 LBS | DIASTOLIC BLOOD PRESSURE: 79 MMHG | RESPIRATION RATE: 18 BRPM | OXYGEN SATURATION: 99 % | BODY MASS INDEX: 40.08 KG/M2

## 2024-12-29 DIAGNOSIS — T50.905A ADVERSE EFFECT OF DRUG, INITIAL ENCOUNTER: Primary | ICD-10-CM

## 2024-12-29 DIAGNOSIS — R11.2 NAUSEA VOMITING AND DIARRHEA: ICD-10-CM

## 2024-12-29 DIAGNOSIS — R19.7 NAUSEA VOMITING AND DIARRHEA: ICD-10-CM

## 2024-12-29 LAB
ALBUMIN SERPL-MCNC: 4.6 G/DL (ref 3.5–5.2)
ALBUMIN/GLOB SERPL: 1.2 G/DL
ALP SERPL-CCNC: 108 U/L (ref 39–117)
ALT SERPL W P-5'-P-CCNC: 25 U/L (ref 1–33)
ANION GAP SERPL CALCULATED.3IONS-SCNC: 15.7 MMOL/L (ref 5–15)
AST SERPL-CCNC: 22 U/L (ref 1–32)
BASOPHILS # BLD AUTO: 0.02 10*3/MM3 (ref 0–0.2)
BASOPHILS NFR BLD AUTO: 0.2 % (ref 0–1.5)
BILIRUB SERPL-MCNC: 0.6 MG/DL (ref 0–1.2)
BUN SERPL-MCNC: 26 MG/DL (ref 8–23)
BUN/CREAT SERPL: 31.3 (ref 7–25)
CALCIUM SPEC-SCNC: 9.4 MG/DL (ref 8.6–10.5)
CHLORIDE SERPL-SCNC: 103 MMOL/L (ref 98–107)
CO2 SERPL-SCNC: 17.3 MMOL/L (ref 22–29)
CREAT SERPL-MCNC: 0.83 MG/DL (ref 0.57–1)
D-LACTATE SERPL-SCNC: 1.1 MMOL/L (ref 0.5–2)
DEPRECATED RDW RBC AUTO: 39.7 FL (ref 37–54)
EGFRCR SERPLBLD CKD-EPI 2021: 79.3 ML/MIN/1.73
EOSINOPHIL # BLD AUTO: 0.1 10*3/MM3 (ref 0–0.4)
EOSINOPHIL NFR BLD AUTO: 1.2 % (ref 0.3–6.2)
ERYTHROCYTE [DISTWIDTH] IN BLOOD BY AUTOMATED COUNT: 12.8 % (ref 12.3–15.4)
GLOBULIN UR ELPH-MCNC: 3.7 GM/DL
GLUCOSE SERPL-MCNC: 120 MG/DL (ref 65–99)
HCT VFR BLD AUTO: 48 % (ref 34–46.6)
HGB BLD-MCNC: 16.8 G/DL (ref 12–15.9)
HOLD SPECIMEN: NORMAL
HOLD SPECIMEN: NORMAL
IMM GRANULOCYTES # BLD AUTO: 0.02 10*3/MM3 (ref 0–0.05)
IMM GRANULOCYTES NFR BLD AUTO: 0.2 % (ref 0–0.5)
LIPASE SERPL-CCNC: 20 U/L (ref 13–60)
LYMPHOCYTES # BLD AUTO: 1.18 10*3/MM3 (ref 0.7–3.1)
LYMPHOCYTES NFR BLD AUTO: 13.8 % (ref 19.6–45.3)
MCH RBC QN AUTO: 29.9 PG (ref 26.6–33)
MCHC RBC AUTO-ENTMCNC: 35 G/DL (ref 31.5–35.7)
MCV RBC AUTO: 85.4 FL (ref 79–97)
MONOCYTES # BLD AUTO: 0.71 10*3/MM3 (ref 0.1–0.9)
MONOCYTES NFR BLD AUTO: 8.3 % (ref 5–12)
NEUTROPHILS NFR BLD AUTO: 6.54 10*3/MM3 (ref 1.7–7)
NEUTROPHILS NFR BLD AUTO: 76.3 % (ref 42.7–76)
NRBC BLD AUTO-RTO: 0 /100 WBC (ref 0–0.2)
PLATELET # BLD AUTO: 279 10*3/MM3 (ref 140–450)
PMV BLD AUTO: 11.2 FL (ref 6–12)
POTASSIUM SERPL-SCNC: 4 MMOL/L (ref 3.5–5.2)
PROT SERPL-MCNC: 8.3 G/DL (ref 6–8.5)
RBC # BLD AUTO: 5.62 10*6/MM3 (ref 3.77–5.28)
SODIUM SERPL-SCNC: 136 MMOL/L (ref 136–145)
WBC NRBC COR # BLD AUTO: 8.57 10*3/MM3 (ref 3.4–10.8)
WHOLE BLOOD HOLD COAG: NORMAL
WHOLE BLOOD HOLD SPECIMEN: NORMAL

## 2024-12-29 PROCEDURE — 83690 ASSAY OF LIPASE: CPT | Performed by: STUDENT IN AN ORGANIZED HEALTH CARE EDUCATION/TRAINING PROGRAM

## 2024-12-29 PROCEDURE — 96375 TX/PRO/DX INJ NEW DRUG ADDON: CPT

## 2024-12-29 PROCEDURE — 25810000003 SODIUM CHLORIDE 0.9 % SOLUTION: Performed by: PHYSICIAN ASSISTANT

## 2024-12-29 PROCEDURE — 96374 THER/PROPH/DIAG INJ IV PUSH: CPT

## 2024-12-29 PROCEDURE — 25010000002 DIPHENHYDRAMINE PER 50 MG: Performed by: PHYSICIAN ASSISTANT

## 2024-12-29 PROCEDURE — 83605 ASSAY OF LACTIC ACID: CPT | Performed by: STUDENT IN AN ORGANIZED HEALTH CARE EDUCATION/TRAINING PROGRAM

## 2024-12-29 PROCEDURE — 25510000001 IOPAMIDOL 61 % SOLUTION: Performed by: STUDENT IN AN ORGANIZED HEALTH CARE EDUCATION/TRAINING PROGRAM

## 2024-12-29 PROCEDURE — 74177 CT ABD & PELVIS W/CONTRAST: CPT

## 2024-12-29 PROCEDURE — 80053 COMPREHEN METABOLIC PANEL: CPT | Performed by: STUDENT IN AN ORGANIZED HEALTH CARE EDUCATION/TRAINING PROGRAM

## 2024-12-29 PROCEDURE — 99285 EMERGENCY DEPT VISIT HI MDM: CPT | Performed by: STUDENT IN AN ORGANIZED HEALTH CARE EDUCATION/TRAINING PROGRAM

## 2024-12-29 PROCEDURE — 85025 COMPLETE CBC W/AUTO DIFF WBC: CPT | Performed by: STUDENT IN AN ORGANIZED HEALTH CARE EDUCATION/TRAINING PROGRAM

## 2024-12-29 PROCEDURE — 25010000002 METOCLOPRAMIDE PER 10 MG: Performed by: PHYSICIAN ASSISTANT

## 2024-12-29 RX ORDER — IOPAMIDOL 612 MG/ML
100 INJECTION, SOLUTION INTRAVASCULAR
Status: COMPLETED | OUTPATIENT
Start: 2024-12-29 | End: 2024-12-29

## 2024-12-29 RX ORDER — ONDANSETRON 4 MG/1
4 TABLET, ORALLY DISINTEGRATING ORAL EVERY 8 HOURS PRN
Qty: 12 TABLET | Refills: 0 | Status: SHIPPED | OUTPATIENT
Start: 2024-12-29

## 2024-12-29 RX ORDER — DIPHENHYDRAMINE HYDROCHLORIDE 50 MG/ML
25 INJECTION INTRAMUSCULAR; INTRAVENOUS ONCE
Status: COMPLETED | OUTPATIENT
Start: 2024-12-29 | End: 2024-12-29

## 2024-12-29 RX ORDER — LOPERAMIDE HYDROCHLORIDE 2 MG/1
2 CAPSULE ORAL 4 TIMES DAILY PRN
Qty: 20 CAPSULE | Refills: 0 | Status: SHIPPED | OUTPATIENT
Start: 2024-12-29

## 2024-12-29 RX ORDER — METOCLOPRAMIDE HYDROCHLORIDE 5 MG/ML
10 INJECTION INTRAMUSCULAR; INTRAVENOUS ONCE
Status: COMPLETED | OUTPATIENT
Start: 2024-12-29 | End: 2024-12-29

## 2024-12-29 RX ORDER — LOPERAMIDE HYDROCHLORIDE 2 MG/1
2 CAPSULE ORAL ONCE
Status: COMPLETED | OUTPATIENT
Start: 2024-12-29 | End: 2024-12-29

## 2024-12-29 RX ORDER — SODIUM CHLORIDE 0.9 % (FLUSH) 0.9 %
10 SYRINGE (ML) INJECTION AS NEEDED
Status: DISCONTINUED | OUTPATIENT
Start: 2024-12-29 | End: 2024-12-29 | Stop reason: HOSPADM

## 2024-12-29 RX ADMIN — IOPAMIDOL 100 ML: 612 INJECTION, SOLUTION INTRAVENOUS at 11:25

## 2024-12-29 RX ADMIN — SODIUM CHLORIDE 1000 ML: 9 INJECTION, SOLUTION INTRAVENOUS at 10:55

## 2024-12-29 RX ADMIN — DIPHENHYDRAMINE HYDROCHLORIDE 25 MG: 50 INJECTION, SOLUTION INTRAMUSCULAR; INTRAVENOUS at 10:59

## 2024-12-29 RX ADMIN — METOCLOPRAMIDE 10 MG: 5 INJECTION, SOLUTION INTRAMUSCULAR; INTRAVENOUS at 11:00

## 2024-12-29 RX ADMIN — LOPERAMIDE HYDROCHLORIDE 2 MG: 2 CAPSULE ORAL at 12:57

## 2024-12-29 NOTE — ED PROVIDER NOTES
Subjective:  Chief Complaint:  Nausea, vomiting, diarrhea, abdominal pain    History of Present Illness:  Patient is a 64-year-old female with history of anxiety, arthritis, endometriosis, GERD, hypothyroidism, among others presenting to the ER with complaints of nausea, vomiting, diarrhea, epigastric abdominal pain.  Patient recently started semaglutide.  States she took her fourth injection on Wednesday and symptoms started on Friday.  States that she is having some mild epigastric pain but mainly her issue is profuse watery diarrhea.  States it was yellow and now it is green.  Denies any bloody stools, fevers, and additional symptoms or complaints at this time.      Nurses Notes reviewed and agree, including vitals, allergies, social history and prior medical history.     REVIEW OF SYSTEMS: All systems reviewed and not pertinent unless noted.  Review of Systems   Gastrointestinal:  Positive for abdominal pain, diarrhea, nausea and vomiting.   All other systems reviewed and are negative.      Past Medical History:   Diagnosis Date    Anxiety     Arthritis     Endometriosis     GERD (gastroesophageal reflux disease)     History of panic attacks     Hypothyroid     Palpitations     anxiety induced    PONV (postoperative nausea and vomiting)     Uterine leiomyoma        Allergies:    Garlic and Oxycodone      Past Surgical History:   Procedure Laterality Date    ENDOSCOPY N/A 02/10/2023    Procedure: ESOPHAGOGASTRODUODENOSCOPY with biopsy;  Surgeon: Ezra Iniguez MD;  Location: [facility] ENDOSCOPY;  Service: Gastroenterology;  Laterality: N/A;    EXPLORATORY LAPAROTOMY  02/07/2023    Patient said she had several prior to hysterectomy    HYSTERECTOMY Bilateral 1999    Total Hysterectomy    LAPAROSCOPIC CHOLECYSTECTOMY  03/15/2022    MYOMECTOMY      OOPHORECTOMY      TOTAL KNEE ARTHROPLASTY Right 05/13/2021    Procedure: TOTAL KNEE ARTHROPLASTY RIGHT;  Surgeon: Fernando German MD;  Location: [facility] OR;   "Service: Orthopedics;  Laterality: Right;    TOTAL KNEE ARTHROPLASTY Left 2024    Procedure: TOTAL KNEE ARTHROPLASTY WITH CORI ROBOT - LEFT;  Surgeon: Fernando German MD;  Location: [facility];  Service: Robotics - Ortho;  Laterality: Left;         Social History     Socioeconomic History    Marital status:    Tobacco Use    Smoking status: Former     Current packs/day: 0.00     Types: Cigarettes     Quit date:      Years since quittin.0    Smokeless tobacco: Never   Vaping Use    Vaping status: Never Used   Substance and Sexual Activity    Alcohol use: No    Drug use: No    Sexual activity: Not Currently     Partners: Male         Family History   Problem Relation Age of Onset    Arthritis Mother     Diabetes Mother     Hypertension Mother     Thyroid disease Mother     Hyperlipidemia Mother     Heart attack Father     Heart disease Father     Cancer Maternal Aunt     Breast cancer Neg Hx     Ovarian cancer Neg Hx        Objective  Physical Exam:  /83   Pulse 86   Temp 98.3 °F (36.8 °C) (Oral)   Resp 18   Ht 157.5 cm (62.01\")   Wt 98.8 kg (217 lb 12.8 oz)   SpO2 98%   BMI 39.82 kg/m²      Physical Exam  Vitals and nursing note reviewed.   Constitutional:       General: She is not in acute distress.     Appearance: She is not toxic-appearing.   HENT:      Head: Normocephalic and atraumatic.      Right Ear: External ear normal.      Left Ear: External ear normal.      Nose: Nose normal.   Eyes:      Extraocular Movements: Extraocular movements intact.      Conjunctiva/sclera: Conjunctivae normal.   Cardiovascular:      Rate and Rhythm: Tachycardia present.   Pulmonary:      Effort: Pulmonary effort is normal. No respiratory distress.   Abdominal:      General: There is no distension.      Palpations: Abdomen is soft.      Tenderness: There is abdominal tenderness. There is no guarding or rebound.   Musculoskeletal:         General: Normal range of motion.      Cervical back: " Normal range of motion and neck supple.   Skin:     General: Skin is warm and dry.   Neurological:      General: No focal deficit present.      Mental Status: She is alert and oriented to person, place, and time.   Psychiatric:         Mood and Affect: Mood normal.         Behavior: Behavior normal.         Procedures    ED Course:    ED Course as of 12/29/24 1249   Sun Dec 29, 2024   1242 I have reviewed the mid-level provider(s) note and verbally discussed the case/plan of care.  I was available for consultation as needed at all times during the patient's visit in the Emergency Department.  I agree with the clinical impression, plan, and disposition unless otherwise stated in the MDM below.    ATTENDING ATTESTATION  HPI: 64-year-old female presents with nausea, vomiting, diarrhea since Friday.  Currently on semaglutide for the past month for weight loss.    MDM: ED workup reviewed.    I have reviewed the labs results.  Clinically unremarkable.    Radiology reports reviewed.     CT Abdomen Pelvis With Contrast    Result Date: 12/29/2024  1. No evidence of appendicitis. 2. Fluid-filled large and small bowel in a pattern suggestive of enterocolitis or ileus. No obstruction. 3. No evidence of biliary obstruction or pancreatitis.   This study was performed with techniques to keep radiation doses as low as reasonably achievable (ALARA). Individualized dose reduction techniques using automated exposure control or adjustment of vA and/or kV according to the patient size were employed.  This report was signed and finalized on 12/29/2024 11:49 AM by Jewel Segal MD.         [JS]      ED Course User Index  [JS] Andrei Garcia DO       Lab Results (last 24 hours)       Procedure Component Value Units Date/Time    CBC & Differential [[id number]]  (Abnormal) Collected: 12/29/24 1050    Specimen: Blood from Arm, Left Updated: 12/29/24 1056    Narrative:      The following orders were created for panel order CBC &  Differential.  Procedure                               Abnormality         Status                     ---------                               -----------         ------                     CBC Auto Differential[[id number]]        Abnormal            Final result                 Please view results for these tests on the individual orders.    Comprehensive Metabolic Panel [[id number]]  (Abnormal) Collected: 12/29/24 1050    Specimen: Blood from Arm, Left Updated: 12/29/24 1128     Glucose 120 mg/dL      BUN 26 mg/dL      Creatinine 0.83 mg/dL      Sodium 136 mmol/L      Potassium 4.0 mmol/L      Chloride 103 mmol/L      CO2 17.3 mmol/L      Calcium 9.4 mg/dL      Total Protein 8.3 g/dL      Albumin 4.6 g/dL      ALT (SGPT) 25 U/L      AST (SGOT) 22 U/L      Alkaline Phosphatase 108 U/L      Total Bilirubin 0.6 mg/dL      Globulin 3.7 gm/dL      A/G Ratio 1.2 g/dL      BUN/Creatinine Ratio 31.3     Anion Gap 15.7 mmol/L      eGFR 79.3 mL/min/1.73     Narrative:      GFR Categories in Chronic Kidney Disease (CKD)      GFR Category          GFR (mL/min/1.73)    Interpretation  G1                     90 or greater         Normal or high (1)  G2                      60-89                Mild decrease (1)  G3a                   45-59                Mild to moderate decrease  G3b                   30-44                Moderate to severe decrease  G4                    15-29                Severe decrease  G5                    14 or less           Kidney failure          (1)In the absence of evidence of kidney disease, neither GFR category G1 or G2 fulfill the criteria for CKD.    eGFR calculation 2021 CKD-EPI creatinine equation, which does not include race as a factor    Lipase [[id number]]  (Normal) Collected: 12/29/24 1050    Specimen: Blood from Arm, Left Updated: 12/29/24 1128     Lipase 20 U/L     Lactic Acid, Plasma [[id number]]  (Normal) Collected: 12/29/24 1050    Specimen: Blood from Arm, Left Updated: 12/29/24 1124      Lactate 1.1 mmol/L     CBC Auto Differential [[id number]]  (Abnormal) Collected: 12/29/24 1050    Specimen: Blood from Arm, Left Updated: 12/29/24 1056     WBC 8.57 10*3/mm3      RBC 5.62 10*6/mm3      Hemoglobin 16.8 g/dL      Hematocrit 48.0 %      MCV 85.4 fL      MCH 29.9 pg      MCHC 35.0 g/dL      RDW 12.8 %      RDW-SD 39.7 fl      MPV 11.2 fL      Platelets 279 10*3/mm3      Neutrophil % 76.3 %      Lymphocyte % 13.8 %      Monocyte % 8.3 %      Eosinophil % 1.2 %      Basophil % 0.2 %      Immature Grans % 0.2 %      Neutrophils, Absolute 6.54 10*3/mm3      Lymphocytes, Absolute 1.18 10*3/mm3      Monocytes, Absolute 0.71 10*3/mm3      Eosinophils, Absolute 0.10 10*3/mm3      Basophils, Absolute 0.02 10*3/mm3      Immature Grans, Absolute 0.02 10*3/mm3      nRBC 0.0 /100 WBC              CT Abdomen Pelvis With Contrast    Result Date: 12/29/2024  PROCEDURE: CT ABDOMEN PELVIS W CONTRAST-  TECHNIQUE: IV contrast enhanced exam  HISTORY: abdominal pain, epigastric, recently started Semiglutide  COMPARISON: None  FINDINGS:  ABDOMEN: Small hepatic cysts are present. Solid organs are otherwise unremarkable.  Patient is status post cholecystectomy.  Central mesenteric vessels appear widely patent.  Small and large bowel are fluid-filled which may be seen with enterocolitis or ileus. No obstruction is present.  PELVIS: Appendix is normal. Patient is status post hysterectomy. Bladder is unremarkable. Numerous borderline enlarged lymph nodes right lower quadrant are seen most likely reactive lymph nodes with mild mesenteric adenitis.      Impression: 1. No evidence of appendicitis. 2. Fluid-filled large and small bowel in a pattern suggestive of enterocolitis or ileus. No obstruction. 3. No evidence of biliary obstruction or pancreatitis.   This study was performed with techniques to keep radiation doses as low as reasonably achievable (ALARA). Individualized dose reduction techniques using automated exposure  control or adjustment of vA and/or kV according to the patient size were employed.  This report was signed and finalized on 12/29/2024 11:49 AM by Jewel Segal MD.          MDM  Patient evaluated in the ER for epigastric pain, nausea, vomiting, diarrhea that started 2 days ago.  Patient is hemodynamically stable, afebrile, nontoxic-appearing on exam.  She did recently start semaglutide, took fourth injection on Wednesday and symptoms started 2 days later.  Differential diagnosis includes but not limited to pancreatitis, gastritis, side effects of semaglutide, delayed gastric emptying, dehydration, electrolyte abnormalities, among others.  Initial plan includes CBC, CMP, lipase, lactate, urinalysis, CT abdomen pelvis with contrast, initial treatment with Reglan, Benadryl, fluids.    Labs are unremarkable other than decreased CO2 and slightly elevated anion gap with normal glucose.  Feel this is likely secondary to dehydration from profuse diarrhea.  Patient given Imodium in the ER.  Prescription sent for Zofran and Imodium.  CT findings consistent with enterocolitis or ileus per radiology.  Discussed with patient that this could be secondary to semaglutide as it does delay gastric emptying.  Patient agreeable with plan for discharge.  Recommended follow-up with PCP for further outpatient evaluation and management of semaglutide as patient is considering stopping this.  She is agreeable with plan for discharge.  Precautions were given for return to the ER for any new or worsening symptoms.    Final diagnoses:   Adverse effect of drug, initial encounter   Nausea vomiting and diarrhea          Agnes Santos PA-C  12/29/24 1242

## 2024-12-29 NOTE — DISCHARGE INSTRUCTIONS
Take Zofran and Imodium as prescribed as needed.  Drink plenty of fluids to rehydrate.  Follow-up with your PCP for further outpatient evaluation and to discuss further management of semaglutide.  Return to the ER for new or worsening symptoms or acute concerns.

## 2025-06-20 ENCOUNTER — PATIENT ROUNDING (BHMG ONLY) (OUTPATIENT)
Dept: URGENT CARE | Facility: CLINIC | Age: 65
End: 2025-06-20
Payer: COMMERCIAL

## 2025-07-05 ENCOUNTER — TRANSCRIBE ORDERS (OUTPATIENT)
Dept: LAB | Facility: HOSPITAL | Age: 65
End: 2025-07-05
Payer: COMMERCIAL

## 2025-07-05 ENCOUNTER — LAB (OUTPATIENT)
Dept: LAB | Facility: HOSPITAL | Age: 65
End: 2025-07-05
Payer: COMMERCIAL

## 2025-07-05 DIAGNOSIS — E03.9 HYPOTHYROIDISM, UNSPECIFIED TYPE: ICD-10-CM

## 2025-07-05 DIAGNOSIS — E03.9 HYPOTHYROIDISM, UNSPECIFIED TYPE: Primary | ICD-10-CM

## 2025-07-05 LAB
BASOPHILS # BLD AUTO: 0.08 10*3/MM3 (ref 0–0.2)
BASOPHILS NFR BLD AUTO: 1.1 % (ref 0–1.5)
CHOLEST SERPL-MCNC: 175 MG/DL (ref 0–200)
DEPRECATED RDW RBC AUTO: 44.5 FL (ref 37–54)
EOSINOPHIL # BLD AUTO: 0.17 10*3/MM3 (ref 0–0.4)
EOSINOPHIL NFR BLD AUTO: 2.3 % (ref 0.3–6.2)
ERYTHROCYTE [DISTWIDTH] IN BLOOD BY AUTOMATED COUNT: 13.1 % (ref 12.3–15.4)
HCT VFR BLD AUTO: 43.5 % (ref 34–46.6)
HDLC SERPL-MCNC: 45 MG/DL (ref 40–60)
HGB BLD-MCNC: 14.3 G/DL (ref 12–15.9)
IMM GRANULOCYTES # BLD AUTO: 0.03 10*3/MM3 (ref 0–0.05)
IMM GRANULOCYTES NFR BLD AUTO: 0.4 % (ref 0–0.5)
LDLC SERPL CALC-MCNC: 117 MG/DL (ref 0–100)
LDLC/HDLC SERPL: 2.58 {RATIO}
LYMPHOCYTES # BLD AUTO: 1.6 10*3/MM3 (ref 0.7–3.1)
LYMPHOCYTES NFR BLD AUTO: 21.2 % (ref 19.6–45.3)
MCH RBC QN AUTO: 30.2 PG (ref 26.6–33)
MCHC RBC AUTO-ENTMCNC: 32.9 G/DL (ref 31.5–35.7)
MCV RBC AUTO: 92 FL (ref 79–97)
MONOCYTES # BLD AUTO: 0.71 10*3/MM3 (ref 0.1–0.9)
MONOCYTES NFR BLD AUTO: 9.4 % (ref 5–12)
NEUTROPHILS NFR BLD AUTO: 4.95 10*3/MM3 (ref 1.7–7)
NEUTROPHILS NFR BLD AUTO: 65.6 % (ref 42.7–76)
NRBC BLD AUTO-RTO: 0 /100 WBC (ref 0–0.2)
PLATELET # BLD AUTO: 233 10*3/MM3 (ref 140–450)
PMV BLD AUTO: 11.2 FL (ref 6–12)
RBC # BLD AUTO: 4.73 10*6/MM3 (ref 3.77–5.28)
T3FREE SERPL-MCNC: 2.84 PG/ML (ref 2–4.4)
T4 FREE SERPL-MCNC: 1.4 NG/DL (ref 0.92–1.68)
TRIGL SERPL-MCNC: 70 MG/DL (ref 0–150)
TSH SERPL DL<=0.05 MIU/L-ACNC: 3.5 UIU/ML (ref 0.27–4.2)
VLDLC SERPL-MCNC: 13 MG/DL (ref 5–40)
WBC NRBC COR # BLD AUTO: 7.54 10*3/MM3 (ref 3.4–10.8)

## 2025-07-05 PROCEDURE — 84439 ASSAY OF FREE THYROXINE: CPT

## 2025-07-05 PROCEDURE — 36415 COLL VENOUS BLD VENIPUNCTURE: CPT

## 2025-07-05 PROCEDURE — 84481 FREE ASSAY (FT-3): CPT

## 2025-07-05 PROCEDURE — 80061 LIPID PANEL: CPT

## 2025-07-05 PROCEDURE — 85025 COMPLETE CBC W/AUTO DIFF WBC: CPT

## 2025-07-05 PROCEDURE — 84443 ASSAY THYROID STIM HORMONE: CPT

## (undated) DEVICE — STRYKER PERFORMANCE SERIES SAGITTAL BLADE: Brand: STRYKER PERFORMANCE SERIES

## (undated) DEVICE — FRCP BX RADJAW4 NDL 2.8 240 STD OG

## (undated) DEVICE — SYS CLS SKIN PREMIERPRO EXOFINFUSION 22CM

## (undated) DEVICE — NEEDLE, QUINCKE, 18GX3.5": Brand: MEDLINE

## (undated) DEVICE — ANTIBACTERIAL UNDYED BRAIDED (POLYGLACTIN 910), SYNTHETIC ABSORBABLE SUTURE: Brand: COATED VICRYL

## (undated) DEVICE — HYBRID TUBING/CAP SET FOR OLYMPUS® SCOPES: Brand: ERBE

## (undated) DEVICE — NDL HYPO ECLPS SFTY 18G 1 1/2IN

## (undated) DEVICE — PK KN TOTL 10

## (undated) DEVICE — PUMP PAIN AUTOFUSER AUTO SELCT NOBOLUS 1TO14ML/HR 550ML DISP

## (undated) DEVICE — SUT MONOCRYL PLS ANTIB UND 3/0  PS1 27IN

## (undated) DEVICE — DRSNG PAD ABD 8X10IN STRL

## (undated) DEVICE — GLV SURG TRIUMPH MICRO PF LTX 7.5 STRL

## (undated) DEVICE — PAD,ARMBOARD,CONV,FOAM,2X8X20",12PR/CS: Brand: MEDLINE

## (undated) DEVICE — Device

## (undated) DEVICE — BNDG ELAS W/CLIP 6IN 10YD LF STRL

## (undated) DEVICE — PATIENT RETURN ELECTRODE, SINGLE-USE, CONTACT QUALITY MONITORING, ADULT, WITH 9FT CORD, FOR PATIENTS WEIGING OVER 33LBS. (15KG): Brand: MEGADYNE

## (undated) DEVICE — MEDI-VAC NON-CONDUCTIVE SUCTION TUBING: Brand: CARDINAL HEALTH

## (undated) DEVICE — PULLOVER TOGA, 2X LARGE: Brand: FLYTE, SURGICOOL

## (undated) DEVICE — BLANKT WARM UPPR/BDY ARM/OUT 57X196CM

## (undated) DEVICE — INTENDED FOR TISSUE SEPARATION, AND OTHER PROCEDURES THAT REQUIRE A SHARP SURGICAL BLADE TO PUNCTURE OR CUT.: Brand: BARD-PARKER ® STAINLESS STEEL BLADES

## (undated) DEVICE — ENDOSCOPY PORT CONNECTOR FOR OLYMPUS® SCOPES: Brand: ERBE

## (undated) DEVICE — SOL PVPI 10PCT 0.75OZ PEEL/PCH/PACKET 1P/U STRL

## (undated) DEVICE — GLV SURG PREMIERPRO MIC LTX PF SZ8 BRN

## (undated) DEVICE — UNDERCAST PADDING: Brand: DEROYAL

## (undated) DEVICE — TRY EPID SFTY 18G 3.5IN 1T7680

## (undated) DEVICE — 3 BONE CEMENT MIXER: Brand: MIXEVAC

## (undated) DEVICE — CONMED SCOPE SAVER BITE BLOCK, 20X27 MM: Brand: SCOPE SAVER

## (undated) DEVICE — STERILE PVP: Brand: MEDLINE INDUSTRIES, INC.

## (undated) DEVICE — LUBE JELLY PK/2.75GM STRL BX/144

## (undated) DEVICE — DISPOSABLE TOURNIQUET CUFF SINGLE BLADDER, DUAL PORT AND QUICK CONNECT CONNECTOR: Brand: COLOR CUFF

## (undated) DEVICE — CVR FTSWITCH UNIV

## (undated) DEVICE — SYR LUERLOK 30CC

## (undated) DEVICE — ELECTRD BLD EZ CLN STD 2.5IN

## (undated) DEVICE — PENCL ROCKRSWCH MEGADYNE W/HOLSTR 10FT SS

## (undated) DEVICE — TRAP FLD MINIVAC MEGADYNE 100ML

## (undated) DEVICE — TBG PENCL TELESCP MEGADYNE SMOKE EVAC 10FT

## (undated) DEVICE — VLV SXN AIR/H2O ORCAPOD3 1P/U STRL

## (undated) DEVICE — SHEET,DRAPE,53X77,STERILE: Brand: MEDLINE

## (undated) DEVICE — KT PUMP INFUBLOCK MDL 2100 PMKITSOLIS

## (undated) DEVICE — 3M™ IOBAN™ 2 ANTIMICROBIAL INCISE DRAPE 6651EZ: Brand: IOBAN™ 2

## (undated) DEVICE — PAD ARMBRD SURG CONVOL 7.5X20X2IN

## (undated) DEVICE — TB SXN FRAZIER 12F STRL